# Patient Record
Sex: MALE | Race: WHITE | NOT HISPANIC OR LATINO | Employment: OTHER | URBAN - METROPOLITAN AREA
[De-identification: names, ages, dates, MRNs, and addresses within clinical notes are randomized per-mention and may not be internally consistent; named-entity substitution may affect disease eponyms.]

---

## 2021-01-01 ENCOUNTER — HOSPITAL ENCOUNTER (INPATIENT)
Facility: MEDICAL CENTER | Age: 71
LOS: 4 days | DRG: 039 | End: 2021-01-05
Attending: INTERNAL MEDICINE | Admitting: HOSPITALIST
Payer: MEDICARE

## 2021-01-01 ENCOUNTER — APPOINTMENT (OUTPATIENT)
Dept: RADIOLOGY | Facility: MEDICAL CENTER | Age: 71
DRG: 039 | End: 2021-01-01
Attending: HOSPITALIST
Payer: MEDICARE

## 2021-01-01 ENCOUNTER — HOSPITAL ENCOUNTER (OUTPATIENT)
Dept: RADIOLOGY | Facility: MEDICAL CENTER | Age: 71
End: 2021-01-01
Payer: MEDICARE

## 2021-01-01 DIAGNOSIS — I63.9 ACUTE CVA (CEREBROVASCULAR ACCIDENT) (HCC): ICD-10-CM

## 2021-01-01 PROBLEM — E78.5 HYPERLIPIDEMIA: Status: ACTIVE | Noted: 2021-01-01

## 2021-01-01 LAB — COVID ORDER STATUS COVID19: NORMAL

## 2021-01-01 PROCEDURE — 70551 MRI BRAIN STEM W/O DYE: CPT

## 2021-01-01 PROCEDURE — C9803 HOPD COVID-19 SPEC COLLECT: HCPCS | Performed by: INTERNAL MEDICINE

## 2021-01-01 PROCEDURE — 99221 1ST HOSP IP/OBS SF/LOW 40: CPT | Mod: AI | Performed by: HOSPITALIST

## 2021-01-01 PROCEDURE — 770006 HCHG ROOM/CARE - MED/SURG/GYN SEMI*

## 2021-01-01 PROCEDURE — U0003 INFECTIOUS AGENT DETECTION BY NUCLEIC ACID (DNA OR RNA); SEVERE ACUTE RESPIRATORY SYNDROME CORONAVIRUS 2 (SARS-COV-2) (CORONAVIRUS DISEASE [COVID-19]), AMPLIFIED PROBE TECHNIQUE, MAKING USE OF HIGH THROUGHPUT TECHNOLOGIES AS DESCRIBED BY CMS-2020-01-R: HCPCS

## 2021-01-01 PROCEDURE — U0005 INFEC AGEN DETEC AMPLI PROBE: HCPCS

## 2021-01-01 PROCEDURE — 700111 HCHG RX REV CODE 636 W/ 250 OVERRIDE (IP): Performed by: STUDENT IN AN ORGANIZED HEALTH CARE EDUCATION/TRAINING PROGRAM

## 2021-01-01 RX ORDER — BISACODYL 10 MG
10 SUPPOSITORY, RECTAL RECTAL
Status: DISCONTINUED | OUTPATIENT
Start: 2021-01-01 | End: 2021-01-05 | Stop reason: HOSPADM

## 2021-01-01 RX ORDER — TRAZODONE HYDROCHLORIDE 100 MG/1
50 TABLET ORAL
Status: DISCONTINUED | OUTPATIENT
Start: 2021-01-01 | End: 2021-01-05 | Stop reason: HOSPADM

## 2021-01-01 RX ORDER — BUDESONIDE AND FORMOTEROL FUMARATE DIHYDRATE 160; 4.5 UG/1; UG/1
2 AEROSOL RESPIRATORY (INHALATION) 2 TIMES DAILY
COMMUNITY

## 2021-01-01 RX ORDER — LORAZEPAM 2 MG/ML
0.5 INJECTION INTRAMUSCULAR
Status: COMPLETED | OUTPATIENT
Start: 2021-01-01 | End: 2021-01-01

## 2021-01-01 RX ORDER — ONDANSETRON 4 MG/1
4 TABLET, ORALLY DISINTEGRATING ORAL EVERY 4 HOURS PRN
Status: DISCONTINUED | OUTPATIENT
Start: 2021-01-01 | End: 2021-01-05 | Stop reason: HOSPADM

## 2021-01-01 RX ORDER — POLYETHYLENE GLYCOL 3350 17 G/17G
1 POWDER, FOR SOLUTION ORAL
Status: DISCONTINUED | OUTPATIENT
Start: 2021-01-01 | End: 2021-01-05 | Stop reason: HOSPADM

## 2021-01-01 RX ORDER — AMOXICILLIN 250 MG
2 CAPSULE ORAL 2 TIMES DAILY
Status: DISCONTINUED | OUTPATIENT
Start: 2021-01-01 | End: 2021-01-05 | Stop reason: HOSPADM

## 2021-01-01 RX ORDER — ATORVASTATIN CALCIUM 40 MG/1
40 TABLET, FILM COATED ORAL EVERY EVENING
Status: DISCONTINUED | OUTPATIENT
Start: 2021-01-01 | End: 2021-01-02

## 2021-01-01 RX ORDER — ONDANSETRON 2 MG/ML
4 INJECTION INTRAMUSCULAR; INTRAVENOUS EVERY 4 HOURS PRN
Status: DISCONTINUED | OUTPATIENT
Start: 2021-01-01 | End: 2021-01-03

## 2021-01-01 RX ORDER — ACETAMINOPHEN 325 MG/1
650 TABLET ORAL EVERY 6 HOURS PRN
Status: DISCONTINUED | OUTPATIENT
Start: 2021-01-01 | End: 2021-01-05 | Stop reason: HOSPADM

## 2021-01-01 RX ADMIN — LORAZEPAM 0.5 MG: 2 INJECTION INTRAMUSCULAR; INTRAVENOUS at 21:22

## 2021-01-01 ASSESSMENT — ENCOUNTER SYMPTOMS
DIZZINESS: 0
COUGH: 0
NAUSEA: 0
CHILLS: 0
LOSS OF CONSCIOUSNESS: 0
NERVOUS/ANXIOUS: 1
VOMITING: 0
SPEECH CHANGE: 1
FOCAL WEAKNESS: 0
FALLS: 0
PALPITATIONS: 0
SHORTNESS OF BREATH: 0
ABDOMINAL PAIN: 0
FEVER: 0
HEADACHES: 0

## 2021-01-01 ASSESSMENT — LIFESTYLE VARIABLES
TOTAL SCORE: 0
HAVE YOU EVER FELT YOU SHOULD CUT DOWN ON YOUR DRINKING: NO
TOTAL SCORE: 0
TOTAL SCORE: 0
CONSUMPTION TOTAL: NEGATIVE
EVER FELT BAD OR GUILTY ABOUT YOUR DRINKING: NO
HOW MANY TIMES IN THE PAST YEAR HAVE YOU HAD 5 OR MORE DRINKS IN A DAY: 0
ON A TYPICAL DAY WHEN YOU DRINK ALCOHOL HOW MANY DRINKS DO YOU HAVE: 0
AVERAGE NUMBER OF DAYS PER WEEK YOU HAVE A DRINK CONTAINING ALCOHOL: 0
HAVE PEOPLE ANNOYED YOU BY CRITICIZING YOUR DRINKING: NO
ALCOHOL_USE: NO
EVER HAD A DRINK FIRST THING IN THE MORNING TO STEADY YOUR NERVES TO GET RID OF A HANGOVER: NO
DOES PATIENT WANT TO STOP DRINKING: NO

## 2021-01-01 ASSESSMENT — PATIENT HEALTH QUESTIONNAIRE - PHQ9
2. FEELING DOWN, DEPRESSED, IRRITABLE, OR HOPELESS: NOT AT ALL
SUM OF ALL RESPONSES TO PHQ9 QUESTIONS 1 AND 2: 0
1. LITTLE INTEREST OR PLEASURE IN DOING THINGS: NOT AT ALL

## 2021-01-02 ENCOUNTER — APPOINTMENT (OUTPATIENT)
Dept: CARDIOLOGY | Facility: MEDICAL CENTER | Age: 71
DRG: 039 | End: 2021-01-02
Attending: HOSPITALIST
Payer: MEDICARE

## 2021-01-02 LAB
ALBUMIN SERPL BCP-MCNC: 4 G/DL (ref 3.2–4.9)
ALBUMIN/GLOB SERPL: 1.6 G/DL
ALP SERPL-CCNC: 42 U/L (ref 30–99)
ALT SERPL-CCNC: 24 U/L (ref 2–50)
ANION GAP SERPL CALC-SCNC: 10 MMOL/L (ref 7–16)
AST SERPL-CCNC: 21 U/L (ref 12–45)
BASOPHILS # BLD AUTO: 0.4 % (ref 0–1.8)
BASOPHILS # BLD: 0.04 K/UL (ref 0–0.12)
BILIRUB SERPL-MCNC: 0.9 MG/DL (ref 0.1–1.5)
BUN SERPL-MCNC: 14 MG/DL (ref 8–22)
CALCIUM SERPL-MCNC: 9 MG/DL (ref 8.5–10.5)
CHLORIDE SERPL-SCNC: 101 MMOL/L (ref 96–112)
CHOLEST SERPL-MCNC: 224 MG/DL (ref 100–199)
CO2 SERPL-SCNC: 23 MMOL/L (ref 20–33)
CREAT SERPL-MCNC: 1.1 MG/DL (ref 0.5–1.4)
EOSINOPHIL # BLD AUTO: 0.17 K/UL (ref 0–0.51)
EOSINOPHIL NFR BLD: 1.6 % (ref 0–6.9)
ERYTHROCYTE [DISTWIDTH] IN BLOOD BY AUTOMATED COUNT: 42.4 FL (ref 35.9–50)
EST. AVERAGE GLUCOSE BLD GHB EST-MCNC: 128 MG/DL
GLOBULIN SER CALC-MCNC: 2.5 G/DL (ref 1.9–3.5)
GLUCOSE SERPL-MCNC: 113 MG/DL (ref 65–99)
HBA1C MFR BLD: 6.1 % (ref 0–5.6)
HCT VFR BLD AUTO: 48.5 % (ref 42–52)
HDLC SERPL-MCNC: 51 MG/DL
HGB BLD-MCNC: 16.7 G/DL (ref 14–18)
IMM GRANULOCYTES # BLD AUTO: 0.06 K/UL (ref 0–0.11)
IMM GRANULOCYTES NFR BLD AUTO: 0.6 % (ref 0–0.9)
LDLC SERPL CALC-MCNC: 149 MG/DL
LYMPHOCYTES # BLD AUTO: 2.2 K/UL (ref 1–4.8)
LYMPHOCYTES NFR BLD: 21 % (ref 22–41)
MAGNESIUM SERPL-MCNC: 2 MG/DL (ref 1.5–2.5)
MCH RBC QN AUTO: 31.1 PG (ref 27–33)
MCHC RBC AUTO-ENTMCNC: 34.4 G/DL (ref 33.7–35.3)
MCV RBC AUTO: 90.3 FL (ref 81.4–97.8)
MONOCYTES # BLD AUTO: 1.18 K/UL (ref 0–0.85)
MONOCYTES NFR BLD AUTO: 11.2 % (ref 0–13.4)
NEUTROPHILS # BLD AUTO: 6.84 K/UL (ref 1.82–7.42)
NEUTROPHILS NFR BLD: 65.2 % (ref 44–72)
NRBC # BLD AUTO: 0 K/UL
NRBC BLD-RTO: 0 /100 WBC
PLATELET # BLD AUTO: 220 K/UL (ref 164–446)
PMV BLD AUTO: 9.4 FL (ref 9–12.9)
POTASSIUM SERPL-SCNC: 4.3 MMOL/L (ref 3.6–5.5)
PROT SERPL-MCNC: 6.5 G/DL (ref 6–8.2)
RBC # BLD AUTO: 5.37 M/UL (ref 4.7–6.1)
SARS-COV-2 RNA RESP QL NAA+PROBE: NOTDETECTED
SODIUM SERPL-SCNC: 134 MMOL/L (ref 135–145)
SPECIMEN SOURCE: NORMAL
TRIGL SERPL-MCNC: 121 MG/DL (ref 0–149)
WBC # BLD AUTO: 10.5 K/UL (ref 4.8–10.8)

## 2021-01-02 PROCEDURE — 92610 EVALUATE SWALLOWING FUNCTION: CPT

## 2021-01-02 PROCEDURE — 80053 COMPREHEN METABOLIC PANEL: CPT

## 2021-01-02 PROCEDURE — 99223 1ST HOSP IP/OBS HIGH 75: CPT | Performed by: PSYCHIATRY & NEUROLOGY

## 2021-01-02 PROCEDURE — 36415 COLL VENOUS BLD VENIPUNCTURE: CPT

## 2021-01-02 PROCEDURE — 700102 HCHG RX REV CODE 250 W/ 637 OVERRIDE(OP): Performed by: PSYCHIATRY & NEUROLOGY

## 2021-01-02 PROCEDURE — A9270 NON-COVERED ITEM OR SERVICE: HCPCS | Performed by: HOSPITALIST

## 2021-01-02 PROCEDURE — 83735 ASSAY OF MAGNESIUM: CPT

## 2021-01-02 PROCEDURE — 83036 HEMOGLOBIN GLYCOSYLATED A1C: CPT

## 2021-01-02 PROCEDURE — 770006 HCHG ROOM/CARE - MED/SURG/GYN SEMI*

## 2021-01-02 PROCEDURE — 93306 TTE W/DOPPLER COMPLETE: CPT

## 2021-01-02 PROCEDURE — 99233 SBSQ HOSP IP/OBS HIGH 50: CPT | Performed by: INTERNAL MEDICINE

## 2021-01-02 PROCEDURE — 80061 LIPID PANEL: CPT

## 2021-01-02 PROCEDURE — 700102 HCHG RX REV CODE 250 W/ 637 OVERRIDE(OP): Performed by: HOSPITALIST

## 2021-01-02 PROCEDURE — A9270 NON-COVERED ITEM OR SERVICE: HCPCS | Performed by: PSYCHIATRY & NEUROLOGY

## 2021-01-02 PROCEDURE — 93306 TTE W/DOPPLER COMPLETE: CPT | Mod: 26 | Performed by: INTERNAL MEDICINE

## 2021-01-02 PROCEDURE — 85025 COMPLETE CBC W/AUTO DIFF WBC: CPT

## 2021-01-02 PROCEDURE — 97161 PT EVAL LOW COMPLEX 20 MIN: CPT

## 2021-01-02 RX ORDER — ATORVASTATIN CALCIUM 80 MG/1
80 TABLET, FILM COATED ORAL EVERY EVENING
Status: DISCONTINUED | OUTPATIENT
Start: 2021-01-02 | End: 2021-01-05 | Stop reason: HOSPADM

## 2021-01-02 RX ORDER — CLOPIDOGREL BISULFATE 75 MG/1
75 TABLET ORAL DAILY
Status: DISCONTINUED | OUTPATIENT
Start: 2021-01-02 | End: 2021-01-05 | Stop reason: HOSPADM

## 2021-01-02 RX ADMIN — ATORVASTATIN CALCIUM 80 MG: 80 TABLET, FILM COATED ORAL at 17:59

## 2021-01-02 RX ADMIN — CLOPIDOGREL BISULFATE 75 MG: 75 TABLET ORAL at 12:28

## 2021-01-02 RX ADMIN — TRAZODONE HYDROCHLORIDE 50 MG: 100 TABLET ORAL at 20:01

## 2021-01-02 ASSESSMENT — COGNITIVE AND FUNCTIONAL STATUS - GENERAL
WALKING IN HOSPITAL ROOM: A LITTLE
TOILETING: A LITTLE
DRESSING REGULAR LOWER BODY CLOTHING: A LITTLE
MOBILITY SCORE: 19
SUGGESTED CMS G CODE MODIFIER MOBILITY: CK
HELP NEEDED FOR BATHING: A LITTLE
MOVING FROM LYING ON BACK TO SITTING ON SIDE OF FLAT BED: A LITTLE
CLIMB 3 TO 5 STEPS WITH RAILING: A LITTLE
DRESSING REGULAR UPPER BODY CLOTHING: A LITTLE
CLIMB 3 TO 5 STEPS WITH RAILING: A LITTLE
WALKING IN HOSPITAL ROOM: A LITTLE
MOVING TO AND FROM BED TO CHAIR: A LITTLE
MOBILITY SCORE: 21
SUGGESTED CMS G CODE MODIFIER DAILY ACTIVITY: CJ
DAILY ACTIVITIY SCORE: 20
STANDING UP FROM CHAIR USING ARMS: A LITTLE
STANDING UP FROM CHAIR USING ARMS: A LITTLE
SUGGESTED CMS G CODE MODIFIER MOBILITY: CJ

## 2021-01-02 ASSESSMENT — GAIT ASSESSMENTS
GAIT LEVEL OF ASSIST: SUPERVISED
DEVIATION: INCREASED BASE OF SUPPORT
DISTANCE (FEET): 300

## 2021-01-02 ASSESSMENT — FIBROSIS 4 INDEX: FIB4 SCORE: 1.34
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
## 2021-01-02 NOTE — CARE PLAN
Problem: Risk for Impaired Mobility--Activity Intolerance  Goal: Mobilize and/or Transfer Safely with Maximum Gillespie  Outcome: PROGRESSING AS EXPECTED   Patient ambulating with steady gait  Problem: Knowledge Deficit  Goal: Patient/Significant other demonstrates understanding of disease process, treatment plan, medications and discharge instructions  Outcome: PROGRESSING SLOWER THAN EXPECTED   Patient provided with specific stroke education, educated on stroke risk factors

## 2021-01-02 NOTE — ASSESSMENT & PLAN NOTE
"Reports that he stopped taking his statin ~1 year ago because he heard that statins were \"bad for you\".   - lipid panel pending  - resume statin  "

## 2021-01-02 NOTE — PROGRESS NOTES
[facility] TRIAGE OFFICER DIRECT ADMISSION REPORT  Transferring facility: [facility]  Transferring physician: Dr Reynold Leon, PAC    Chief complaint: Expressive aphasia, slurred speech  Pertinent history & patient course: 70/M who presented to the outlying facility with expressive aphasia and slurred speech with no other motor or sensory deficits, which started yesterday morning. Head CT showed acute CVA. Vascular workup showed complete L ICA occlusion with 95% R ICA occlusion. Neuro IR was contacted who gave the opinion that patient is outside window, and clot is not retrievable. Neurology (Dr. Bazan) has been contacted as well who agreed to the transfer.      Further work up or recommendations per triage officer prior to transfer: none  Consultants called prior to transfer and pertinent input from consultants: Neurology, IR  Patient accepted for transfer: Yes  Consultants to be called upon arrival: Neurology  Admission status: Inpatient.   Floor requested: neurology      Please inform the triage officer upon arrival of the patient to [facility] for assignment of a hospitalist to perform admission.     For any question or concerns regarding the care of this patient, please reach out to the assigned hospitalist.

## 2021-01-02 NOTE — PROGRESS NOTES
With patient’s permission, completed daily phone call to one of patients designated support person, name Livier, timothy  Discussed patient condition and plan of care. All questions answered.  Per daughter, she will update patients wife, marium on plan of care and patients condition.

## 2021-01-02 NOTE — THERAPY
Speech Language Pathology   Clinical Swallow Evaluation     Patient Name: Yuri Smart  AGE:  70 y.o., SEX:  male  Medical Record #: [id number]  Today's Date: 1/2/2021     Precautions  Precautions: (P) Swallow Precautions ( See Comments), Other (See Comments)  Comments: (P) aphasic    Assessment    Patient is 70 y.o. male with a diagnosis of LMCA CVA. Pending MRI results. Per scan at [facility]= LMCA. Pt with no prior SLP within [facility] system. No chest xray in EMR. Per admit records intermittent aphasia and slurred speech. No dysarthria noted but pt with moderate to severe word finding with paraphasic errors and use of non-words. Voice WNL. No marked facial asymmetry. Possible mild oral motor apraxia noted during oral motor assessment. Pt assessed with: single ice chips, NTL from the spoon and cup, thin from spoon and cup, applesauce, and oatmeal. Pt triggering 1-2 swallows in 1-2 seconds. Throat clearing and one cough post applesauce. Slight oral residue that pt is able to clear with lingual sweep with oatmeal. Per nurs, AIC near normal limit and pt does not require ADA. At this time, MM5/MT2 with monitoring and use of posted and recommended swallow strategies. Pt will benefit from lower to moderate level aphasia olu. .  Additional factors influencing patient status/progress: pt able to comprehend with probable better compreh than verbal expression at this time=Brocca's type aphasia.       Plan    Recommend Speech Therapy 5 times per week until therapy goals are met for the following treatments:  Dysphagia Training.    Discharge Recommendations: (P) Recommend post-acute placement for additional speech therapy services prior to discharge home       01/02/21 1207   Oral Motor Eval    Is Patient Able to Complete Oral Motor Eval Yes, Within Normal Limits   Laryngeal Function   Voice Quality Within Functional Limits   Volutional Cough Within Functional Limits   Excursion Upon Swallow Complete   Max Phonation  "Time (Seconds) greater than 10 seconds   Oral Food Presentation   Ice Chips Within Functional Limits   Single Swallow Mildly Thick (2) - (Nectar Thick)  Within Functional Limits   Serial Swallow Mildly Thick (2) - (Nectar Thick) Within Functional Limits   Single Swallow Thin (0) Within Functional Limits   Liquidised (3) Minimal   Minced & Moist (5) - (Dysphagia II) Minimal   Tracheostomy   Tracheostomy  No   Dysphagia Strategies / Recommendations   Strategies / Interventions Recommended (Yes / No) Yes   Compensatory Strategies Monitor During Meals;Reduce Bolus Size to 1/2 Teaspoon;Reduce Bolus Size to 1 Teaspoon;Single Sips / Bites;Alternate Solids / Liquids;Multiple Swallows   Diet / Liquid Recommendation Minced & Moist (5) - (Dysphagia II);Mildly Thick (2) - (Nectar Thick)   Medication Administration  Float Whole with Puree;Crush all Medications in Puree  (crush larger meds and float in applesauce. )   Dysphagia Rating   Nutritional Liquid Intake Rating Scale Thickened beverages (mildly thick unless otherwise specified)   Nutritional Food Intake Rating Scale Total oral diet with multiple consistencies but requiring special preparation or compensations   Patient / Family Goals   Patient / Family Goal #1 \"My speech.\"   Goal #1 Outcome Goal not met   Short Term Goals   Short Term Goal # 1 Pt will consume MM5/MT2 with monitoring during meals and use of posted and recommended swallow strategies.    Goal Outcome # 1 Goal not met   Session Information   Priority 3  (5x,LMCA,ck MRI, MM5/MT2-ice, upgrade as appropNEEDSAPHASIA)         "

## 2021-01-02 NOTE — CONSULTS
"Hospital Neurology Consult:    Referring Physician: Dylan Jensen M.D.    Reason for consultation: Stroke    HPI: Yuri Smart is a 70 y.o. male with history of COPD presenting to the hospital for stroke and consulted for stroke.  Patient presented to [facility] with aphasia.  CTA of the head and neck was performed at that time which showed a left internal carotid artery that was occluded as well as a right internal carotid artery that was 99% stenosed.  I was contacted at that time and the patient was transferred to [facility] for further evaluation.  I had recommended that the patient receive aspirin 325 mg and Plavix 300 mg at that time.  At the time of my exam the patient appears to be doing quite well only making paraphasic errors.  He does not have any complaints other than \"having to sit here doing nothing\".  He denied any sensory deficits or motor deficits along with his stroke symptoms.    ROS:     As above. All other systems reviewed and are negative.    Past Medical History:    has a past medical history of Cancer ([facility]) and COPD (chronic obstructive pulmonary disease) ([facility]).    FHx:  No family history of stroke    SHx:    Denies drug use.     Allergies:  Not on File    Medications:    Current Facility-Administered Medications:   •  clopidogrel (PLAVIX) tablet 75 mg, 75 mg, Oral, DAILY, Maikel Bazan M.D.  •  Pharmacy consult request - Allow for permissive hypertension: SBP up to 220 mmHg/DBP up to 120 mmHg x 48 hours, , Other, PHARMACY TO DOSE, Tabitha Grider M.D.  •  senna-docusate (PERICOLACE or SENOKOT S) 8.6-50 MG per tablet 2 Tab, 2 Tab, Oral, BID, Stopped at 01/01/21 1945 **AND** polyethylene glycol/lytes (MIRALAX) PACKET 1 Packet, 1 Packet, Oral, QDAY PRN **AND** magnesium hydroxide (MILK OF MAGNESIA) suspension 30 mL, 30 mL, Oral, QDAY PRN **AND** bisacodyl (DULCOLAX) suppository 10 mg, 10 mg, Rectal, QDAY PRN, Tabitha Grider M.D.  •  " acetaminophen (Tylenol) tablet 650 mg, 650 mg, Oral, Q6HRS PRN, Tabitha Grider M.D.  •  ondansetron (ZOFRAN) syringe/vial injection 4 mg, 4 mg, Intravenous, Q4HRS PRN, Tabitha Grider M.D.  •  ondansetron (ZOFRAN ODT) dispertab 4 mg, 4 mg, Oral, Q4HRS PRN, Tabitha Grider M.D.  •  aspirin EC (ECOTRIN) tablet 81 mg, 81 mg, Oral, DAILY, Tabitha Grider M.D., Stopped at 01/02/21 0600  •  atorvastatin (LIPITOR) tablet 40 mg, 40 mg, Oral, Q EVENING, Tabitha Grider M.D., Stopped at 01/01/21 2000  •  traZODone (DESYREL) tablet 50 mg, 50 mg, Oral, QHS PRN, Tabitha Grider M.D.    Vitals:   Vitals:    01/01/21 2053 01/02/21 0000 01/02/21 0400 01/02/21 0844   BP: 136/92 135/91 130/65 145/78   Pulse: 76 78 65 80   Resp: 17 17 17 17   Temp: 36.2 °C (97.2 °F) 36.1 °C (97 °F) 36.2 °C (97.2 °F) 36.4 °C (97.5 °F)   TempSrc: Temporal Temporal Temporal Temporal   SpO2: 94% 95% 95% 94%   Weight:       Height:           Labs:     Lab Results   Component Value Date/Time    WBC 10.5 01/02/2021 01:13 AM    RBC 5.37 01/02/2021 01:13 AM    HEMOGLOBIN 16.7 01/02/2021 01:13 AM    HEMATOCRIT 48.5 01/02/2021 01:13 AM    MCV 90.3 01/02/2021 01:13 AM    MCH 31.1 01/02/2021 01:13 AM    MCHC 34.4 01/02/2021 01:13 AM    MPV 9.4 01/02/2021 01:13 AM    NEUTSPOLYS 65.20 01/02/2021 01:13 AM    LYMPHOCYTES 21.00 (L) 01/02/2021 01:13 AM    MONOCYTES 11.20 01/02/2021 01:13 AM    EOSINOPHILS 1.60 01/02/2021 01:13 AM    BASOPHILS 0.40 01/02/2021 01:13 AM      Lab Results   Component Value Date/Time    SODIUM 134 (L) 01/02/2021 06:33 AM    POTASSIUM 4.3 01/02/2021 06:33 AM    CHLORIDE 101 01/02/2021 06:33 AM    CO2 23 01/02/2021 06:33 AM    GLUCOSE 113 (H) 01/02/2021 06:33 AM    BUN 14 01/02/2021 06:33 AM    CREATININE 1.10 01/02/2021 06:33 AM      Lab Results   Component Value Date/Time    CHOLSTRLTOT 224 (H) 01/02/2021 06:33 AM     (H) 01/02/2021 06:33 AM    HDL 51 01/02/2021 06:33 AM    TRIGLYCERIDE 121 01/02/2021 06:33 AM        Lab Results   Component Value Date/Time    ALKPHOSPHAT 42 01/02/2021 06:33 AM    ASTSGOT 21 01/02/2021 06:33 AM    ALTSGPT 24 01/02/2021 06:33 AM    TBILIRUBIN 0.9 01/02/2021 06:33 AM      Imaging/Testing:  MRI brain without contrast personally reviewed which showed infarcts in the left insula and frontal lobe/left MCA distribution without evidence of hemorrhage.    CTA of the head and neck from outside hospital reviewed in chart showing left internal carotid artery 100% occluded and right internal carotid artery with critical stenosis.    Physical Exam:     General: 70-year-old male in bed in no acute distress.  Cardio: Normal S1/S2. No peripheral edema.   Pulm: CTAX2. No respiratory distress.   Skin: Warm, dry, no rashes or lesions   Psychiatric: Appropriate affect. No active psychosis.  HEENT: Atraumatic head, normal sclera and conjunctiva, moist oral mucosa. No lid lag.  Abdomen: Soft, non tender. No masses or hepatosplenomegaly.    1a. Level of Consciousness (Alert, drowsy, etc): 0= Alert    1b. LOC Questions (Month, age): 0= Answers both correctly    1c. LOC Commands (Open/close eyes make fist/let go): 0= Obeys both correctly    2.   Best Gaze (Eyes open - patient follows examiner's finger on face): 0= Normal    3.   Visual Fields (introduce visual stimulus/threat to patient's field quadrants): 0= No visual loss  4.   Facial Paresis (Show teeth, raise eyebrows and squeeze eyes shut): 0= Normal     5a. Motor Arm - Left (Elevate arm to 90 degrees if patient is sitting, 45 degrees if  supine): 0= No drift    5b. Motor Arm - Right (Elevate arm to 90 degrees if patient is sitting, 45 degrees if supine): 0= No drift    6a. Motor Leg - Left (Elevate leg 30 degrees with patient supine): 0= No drift    6b. Motor Leg - Right  (Elevate leg 30 degrees with patient supine): 0= No drift    7.   Limb Ataxia (Finger-nose, heel down shin): 0= No ataxia    8.   Sensory (Pin prick to face, arm, trunk and leg - compare side to  side): 0= Normal    9.  Best Language (Name item, describe a picture and read sentences): 1= Mild to moderate aphasia    10. Dysarthria (Evaluate speech clarity by patient repeating listed words): 0= Normal articulation    11. Extinction and Inattention (Use information from prior testing to identify neglect or  double simultaneous stimuli testing): 0= No neglect    Total NIH Score: 1    Assessment/Plan:    Yuri Smart is a 70 y.o. male with history of COPD presenting to the hospital for stroke and consulted for stroke.  The patient's physical exam is reassuring with only evidence of mild aphasia which is expressive in nature.  He makes occasional paraphasic errors while he speaks however he is able to speak in full sentences and understand.  He has left internal carotid artery occlusion and a critical stenosis of the right internal carotid artery.  He received an aspirin and Plavix load at [facility] prior to being transferred.  I would recommend that the patient continue on dual antiplatelet therapy however given that his right internal carotid artery has critical stenosis even though it is not symptomatic at this time it would merit revascularization given occlusion of the left internal carotid artery; otherwise his brain would be perfused essentially through his posterior circulation.  Mechanism of stroke is artery to artery embolization likely from occlusion of the left internal carotid artery.    Plan:  1.  Continue aspirin 81 mg and Plavix 75 mg  2.  Consult vascular surgery  3.  Continue high intensity statin with atorvastatin 80 mg daily  4.  Hemoglobin A1c is 6.1.  At goal  5.  Speech therapy  6.  We will follow with above recommendations  7.  Plan discussed with consulting physician and patient's nurse.       Maikel Bazan M.D., Diplomat of the American Board of Psychiatry and Neurology  Diplomat of ABPN Epilepsy Subspecialty   Assistant Clinical Professor, [employer]  [facility]  [facility]  Acute Neurology Consultant

## 2021-01-02 NOTE — PROGRESS NOTES
0030: patient with 4.7 second arrest on cardiac monitor, asymptomatic. On call hospitalist updated, orders received to draw AM labs early.    0115: Phlebotomy at bedside, unable to draw enough blood for labs, will attempt redraw.

## 2021-01-02 NOTE — PROGRESS NOTES
Monitor summary: SR, 65-83, LA 0.16, QRS 0.08, QT 0.36, with 3.4-4.7sa rare PAC/PVC per strip from monitor room.

## 2021-01-02 NOTE — DISCHARGE PLANNING
"[facility] Acute [facility] Transitional Care Coordination  Referral from:  Dr. Tabitha Grider  Facesheet indicates: No provider listed - needs to be verified.   Potential Rehab Diagnosis:  L CVA    PT transferred from [facility].   CTA of head showed \"acute left MCA territory infarct with large vessel occlusion of the M3 segment\". Outside the window to attempt retrieval of the clot.  CTA neck showed complete occulusion of the left ICA and 90% occlusion of the right ICA.    Chart review indicates patient has on going medical management and anticipate  therapy needs to possibly meet inpatient rehab facility criteria with the goal of returning to community.    D/C support:  Lives in [place] w/ spouse.      Physiatry consultation pended per protocol; therapy notes.      Last Covid test date.  Results for FILIBERTO EVANGELISTA (MRN [id number]) as of 1/2/2021 10:46   Ref. Range 1/1/2021 22:32   COVID Order Status Unknown Received   SARS-CoV-2 by PCR Unknown NotDetected   SARS-CoV-2 Source Unknown NP Swab         Thank you for the referral.        "

## 2021-01-02 NOTE — ASSESSMENT & PLAN NOTE
"Last normal ~10am on 12/31/20. Left MCA territory infarct. Presented with expressive aphasia and slurred speech. Was evaluated at [facility] and was transferred here for specialty consultation.   - MRI brain pending  - neurology consulted, pending recs  - continue with ASA and statin  - consideration of vascular surgery consult; likely no intervention on the left since it is completely occluded and he had a left MCA infarct  - neuro checks q4 hrs  - A1C and lipid panel pending  - permissive hypertension for 24 more hours, based on symptom onset\"    Await MRI, echo and NEurology recs  R CEA planned todays/p CEA by Dr. Torres 1/3. Drain removed.  Rehab planned.  "
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
## 2021-01-03 ENCOUNTER — ANESTHESIA (OUTPATIENT)
Dept: SURGERY | Facility: MEDICAL CENTER | Age: 71
DRG: 039 | End: 2021-01-03
Payer: MEDICARE

## 2021-01-03 ENCOUNTER — ANESTHESIA EVENT (OUTPATIENT)
Dept: SURGERY | Facility: MEDICAL CENTER | Age: 71
DRG: 039 | End: 2021-01-03
Payer: MEDICARE

## 2021-01-03 LAB
ABO + RH BLD: NORMAL
ABO GROUP BLD: NORMAL
BLD GP AB SCN SERPL QL: NORMAL
EKG IMPRESSION: NORMAL
RH BLD: NORMAL

## 2021-01-03 PROCEDURE — 95940 IONM IN OPERATNG ROOM 15 MIN: CPT | Performed by: SURGERY

## 2021-01-03 PROCEDURE — 160002 HCHG RECOVERY MINUTES (STAT): Performed by: SURGERY

## 2021-01-03 PROCEDURE — 700111 HCHG RX REV CODE 636 W/ 250 OVERRIDE (IP): Performed by: SURGERY

## 2021-01-03 PROCEDURE — 99232 SBSQ HOSP IP/OBS MODERATE 35: CPT | Performed by: INTERNAL MEDICINE

## 2021-01-03 PROCEDURE — 160035 HCHG PACU - 1ST 60 MINS PHASE I: Performed by: SURGERY

## 2021-01-03 PROCEDURE — 501838 HCHG SUTURE GENERAL: Performed by: SURGERY

## 2021-01-03 PROCEDURE — 160048 HCHG OR STATISTICAL LEVEL 1-5: Performed by: SURGERY

## 2021-01-03 PROCEDURE — 99231 SBSQ HOSP IP/OBS SF/LOW 25: CPT | Performed by: PSYCHIATRY & NEUROLOGY

## 2021-01-03 PROCEDURE — 700111 HCHG RX REV CODE 636 W/ 250 OVERRIDE (IP): Performed by: ANESTHESIOLOGY

## 2021-01-03 PROCEDURE — 500368 HCHG DRAIN, 7MM FLAT-FLUTED: Performed by: SURGERY

## 2021-01-03 PROCEDURE — 86901 BLOOD TYPING SEROLOGIC RH(D): CPT

## 2021-01-03 PROCEDURE — 86850 RBC ANTIBODY SCREEN: CPT

## 2021-01-03 PROCEDURE — 700105 HCHG RX REV CODE 258: Performed by: SURGERY

## 2021-01-03 PROCEDURE — 770020 HCHG ROOM/CARE - TELE (206)

## 2021-01-03 PROCEDURE — 51798 US URINE CAPACITY MEASURE: CPT

## 2021-01-03 PROCEDURE — 160039 HCHG SURGERY MINUTES - EA ADDL 1 MIN LEVEL 3: Performed by: SURGERY

## 2021-01-03 PROCEDURE — 95955 EEG DURING SURGERY: CPT | Performed by: SURGERY

## 2021-01-03 PROCEDURE — 700102 HCHG RX REV CODE 250 W/ 637 OVERRIDE(OP): Performed by: HOSPITALIST

## 2021-01-03 PROCEDURE — A9270 NON-COVERED ITEM OR SERVICE: HCPCS | Performed by: SURGERY

## 2021-01-03 PROCEDURE — 160028 HCHG SURGERY MINUTES - 1ST 30 MINS LEVEL 3: Performed by: SURGERY

## 2021-01-03 PROCEDURE — C1781 MESH (IMPLANTABLE): HCPCS | Performed by: SURGERY

## 2021-01-03 PROCEDURE — 501837 HCHG SUTURE CV: Performed by: SURGERY

## 2021-01-03 PROCEDURE — 110454 HCHG SHELL REV 250: Performed by: SURGERY

## 2021-01-03 PROCEDURE — 4A11X4G MONITORING OF PERIPHERAL NERVOUS ELECTRICAL ACTIVITY, INTRAOPERATIVE, EXTERNAL APPROACH: ICD-10-PCS | Performed by: SURGERY

## 2021-01-03 PROCEDURE — A9270 NON-COVERED ITEM OR SERVICE: HCPCS | Performed by: HOSPITALIST

## 2021-01-03 PROCEDURE — 700111 HCHG RX REV CODE 636 W/ 250 OVERRIDE (IP)

## 2021-01-03 PROCEDURE — 36415 COLL VENOUS BLD VENIPUNCTURE: CPT

## 2021-01-03 PROCEDURE — 500257: Performed by: SURGERY

## 2021-01-03 PROCEDURE — 86900 BLOOD TYPING SEROLOGIC ABO: CPT

## 2021-01-03 PROCEDURE — 160009 HCHG ANES TIME/MIN: Performed by: SURGERY

## 2021-01-03 PROCEDURE — 770006 HCHG ROOM/CARE - MED/SURG/GYN SEMI*

## 2021-01-03 PROCEDURE — 700101 HCHG RX REV CODE 250: Performed by: SURGERY

## 2021-01-03 PROCEDURE — 700101 HCHG RX REV CODE 250: Performed by: ANESTHESIOLOGY

## 2021-01-03 PROCEDURE — 700102 HCHG RX REV CODE 250 W/ 637 OVERRIDE(OP): Performed by: SURGERY

## 2021-01-03 PROCEDURE — 93005 ELECTROCARDIOGRAM TRACING: CPT | Performed by: SURGERY

## 2021-01-03 PROCEDURE — A9270 NON-COVERED ITEM OR SERVICE: HCPCS | Performed by: PSYCHIATRY & NEUROLOGY

## 2021-01-03 PROCEDURE — 700105 HCHG RX REV CODE 258: Performed by: ANESTHESIOLOGY

## 2021-01-03 PROCEDURE — 502648 HCHG APPLICATOR, EVICEL: Performed by: SURGERY

## 2021-01-03 PROCEDURE — 500002 HCHG ADHESIVE, DERMABOND: Performed by: SURGERY

## 2021-01-03 PROCEDURE — 03UK0KZ SUPPLEMENT RIGHT INTERNAL CAROTID ARTERY WITH NONAUTOLOGOUS TISSUE SUBSTITUTE, OPEN APPROACH: ICD-10-PCS | Performed by: SURGERY

## 2021-01-03 PROCEDURE — 700102 HCHG RX REV CODE 250 W/ 637 OVERRIDE(OP): Performed by: PSYCHIATRY & NEUROLOGY

## 2021-01-03 PROCEDURE — 95938 SOMATOSENSORY TESTING: CPT | Performed by: SURGERY

## 2021-01-03 PROCEDURE — 03CK0ZZ EXTIRPATION OF MATTER FROM RIGHT INTERNAL CAROTID ARTERY, OPEN APPROACH: ICD-10-PCS | Performed by: SURGERY

## 2021-01-03 PROCEDURE — 160036 HCHG PACU - EA ADDL 30 MINS PHASE I: Performed by: SURGERY

## 2021-01-03 PROCEDURE — 93010 ELECTROCARDIOGRAM REPORT: CPT | Performed by: INTERNAL MEDICINE

## 2021-01-03 PROCEDURE — 88300 SURGICAL PATH GROSS: CPT

## 2021-01-03 DEVICE — PATCH .8X8CM XENOSURE BIOLOGIC VASCULAR---ORDER IN MULTIPLES OF 5---: Type: IMPLANTABLE DEVICE | Status: FUNCTIONAL

## 2021-01-03 RX ORDER — SODIUM CHLORIDE, SODIUM LACTATE, POTASSIUM CHLORIDE, CALCIUM CHLORIDE 600; 310; 30; 20 MG/100ML; MG/100ML; MG/100ML; MG/100ML
INJECTION, SOLUTION INTRAVENOUS
Status: DISCONTINUED | OUTPATIENT
Start: 2021-01-03 | End: 2021-01-03 | Stop reason: SURG

## 2021-01-03 RX ORDER — HYDRALAZINE HYDROCHLORIDE 20 MG/ML
5 INJECTION INTRAMUSCULAR; INTRAVENOUS
Status: DISCONTINUED | OUTPATIENT
Start: 2021-01-03 | End: 2021-01-03 | Stop reason: HOSPADM

## 2021-01-03 RX ORDER — HYDROMORPHONE HYDROCHLORIDE 1 MG/ML
0.1 INJECTION, SOLUTION INTRAMUSCULAR; INTRAVENOUS; SUBCUTANEOUS
Status: DISCONTINUED | OUTPATIENT
Start: 2021-01-03 | End: 2021-01-03 | Stop reason: HOSPADM

## 2021-01-03 RX ORDER — MIDAZOLAM HYDROCHLORIDE 1 MG/ML
INJECTION INTRAMUSCULAR; INTRAVENOUS
Status: COMPLETED
Start: 2021-01-03 | End: 2021-01-03

## 2021-01-03 RX ORDER — DIPHENHYDRAMINE HYDROCHLORIDE 50 MG/ML
25 INJECTION INTRAMUSCULAR; INTRAVENOUS EVERY 6 HOURS PRN
Status: DISCONTINUED | OUTPATIENT
Start: 2021-01-03 | End: 2021-01-05 | Stop reason: HOSPADM

## 2021-01-03 RX ORDER — OXYCODONE HCL 5 MG/5 ML
5 SOLUTION, ORAL ORAL
Status: DISCONTINUED | OUTPATIENT
Start: 2021-01-03 | End: 2021-01-03 | Stop reason: HOSPADM

## 2021-01-03 RX ORDER — DEXAMETHASONE SODIUM PHOSPHATE 4 MG/ML
INJECTION, SOLUTION INTRA-ARTICULAR; INTRALESIONAL; INTRAMUSCULAR; INTRAVENOUS; SOFT TISSUE PRN
Status: DISCONTINUED | OUTPATIENT
Start: 2021-01-03 | End: 2021-01-03 | Stop reason: SURG

## 2021-01-03 RX ORDER — HALOPERIDOL 5 MG/ML
1 INJECTION INTRAMUSCULAR EVERY 6 HOURS PRN
Status: DISCONTINUED | OUTPATIENT
Start: 2021-01-03 | End: 2021-01-05 | Stop reason: HOSPADM

## 2021-01-03 RX ORDER — DEXAMETHASONE SODIUM PHOSPHATE 4 MG/ML
4 INJECTION, SOLUTION INTRA-ARTICULAR; INTRALESIONAL; INTRAMUSCULAR; INTRAVENOUS; SOFT TISSUE
Status: DISCONTINUED | OUTPATIENT
Start: 2021-01-03 | End: 2021-01-05 | Stop reason: HOSPADM

## 2021-01-03 RX ORDER — HYDROMORPHONE HYDROCHLORIDE 1 MG/ML
0.4 INJECTION, SOLUTION INTRAMUSCULAR; INTRAVENOUS; SUBCUTANEOUS
Status: DISCONTINUED | OUTPATIENT
Start: 2021-01-03 | End: 2021-01-03 | Stop reason: HOSPADM

## 2021-01-03 RX ORDER — LABETALOL HYDROCHLORIDE 5 MG/ML
INJECTION, SOLUTION INTRAVENOUS PRN
Status: DISCONTINUED | OUTPATIENT
Start: 2021-01-03 | End: 2021-01-03 | Stop reason: SURG

## 2021-01-03 RX ORDER — MIDAZOLAM HYDROCHLORIDE 1 MG/ML
1 INJECTION INTRAMUSCULAR; INTRAVENOUS
Status: COMPLETED | OUTPATIENT
Start: 2021-01-03 | End: 2021-01-03

## 2021-01-03 RX ORDER — KETOROLAC TROMETHAMINE 30 MG/ML
15 INJECTION, SOLUTION INTRAMUSCULAR; INTRAVENOUS EVERY 6 HOURS
Status: DISCONTINUED | OUTPATIENT
Start: 2021-01-03 | End: 2021-01-05 | Stop reason: HOSPADM

## 2021-01-03 RX ORDER — HEPARIN SODIUM 1000 [USP'U]/ML
INJECTION, SOLUTION INTRAVENOUS; SUBCUTANEOUS PRN
Status: DISCONTINUED | OUTPATIENT
Start: 2021-01-03 | End: 2021-01-03 | Stop reason: SURG

## 2021-01-03 RX ORDER — PROTAMINE SULFATE 10 MG/ML
INJECTION, SOLUTION INTRAVENOUS PRN
Status: DISCONTINUED | OUTPATIENT
Start: 2021-01-03 | End: 2021-01-03 | Stop reason: HOSPADM

## 2021-01-03 RX ORDER — OXYCODONE HYDROCHLORIDE 5 MG/1
5 TABLET ORAL
Status: DISCONTINUED | OUTPATIENT
Start: 2021-01-03 | End: 2021-01-05 | Stop reason: HOSPADM

## 2021-01-03 RX ORDER — LIDOCAINE HYDROCHLORIDE 20 MG/ML
INJECTION, SOLUTION EPIDURAL; INFILTRATION; INTRACAUDAL; PERINEURAL PRN
Status: DISCONTINUED | OUTPATIENT
Start: 2021-01-03 | End: 2021-01-03 | Stop reason: SURG

## 2021-01-03 RX ORDER — HALOPERIDOL 5 MG/ML
1 INJECTION INTRAMUSCULAR
Status: COMPLETED | OUTPATIENT
Start: 2021-01-03 | End: 2021-01-03

## 2021-01-03 RX ORDER — ONDANSETRON 2 MG/ML
4 INJECTION INTRAMUSCULAR; INTRAVENOUS EVERY 4 HOURS PRN
Status: DISCONTINUED | OUTPATIENT
Start: 2021-01-03 | End: 2021-01-05 | Stop reason: HOSPADM

## 2021-01-03 RX ORDER — BUPIVACAINE HYDROCHLORIDE AND EPINEPHRINE 5; 5 MG/ML; UG/ML
INJECTION, SOLUTION EPIDURAL; INTRACAUDAL; PERINEURAL
Status: DISCONTINUED | OUTPATIENT
Start: 2021-01-03 | End: 2021-01-03 | Stop reason: HOSPADM

## 2021-01-03 RX ORDER — ONDANSETRON 2 MG/ML
4 INJECTION INTRAMUSCULAR; INTRAVENOUS
Status: DISCONTINUED | OUTPATIENT
Start: 2021-01-03 | End: 2021-01-03 | Stop reason: HOSPADM

## 2021-01-03 RX ORDER — ROCURONIUM BROMIDE 10 MG/ML
INJECTION, SOLUTION INTRAVENOUS PRN
Status: DISCONTINUED | OUTPATIENT
Start: 2021-01-03 | End: 2021-01-03 | Stop reason: SURG

## 2021-01-03 RX ORDER — OXYCODONE HYDROCHLORIDE 10 MG/1
10 TABLET ORAL
Status: DISCONTINUED | OUTPATIENT
Start: 2021-01-03 | End: 2021-01-05 | Stop reason: HOSPADM

## 2021-01-03 RX ORDER — SCOLOPAMINE TRANSDERMAL SYSTEM 1 MG/1
1 PATCH, EXTENDED RELEASE TRANSDERMAL
Status: DISCONTINUED | OUTPATIENT
Start: 2021-01-03 | End: 2021-01-05 | Stop reason: HOSPADM

## 2021-01-03 RX ORDER — ACETAMINOPHEN 325 MG/1
650 TABLET ORAL EVERY 6 HOURS
Status: DISCONTINUED | OUTPATIENT
Start: 2021-01-03 | End: 2021-01-05 | Stop reason: HOSPADM

## 2021-01-03 RX ORDER — HYDROMORPHONE HYDROCHLORIDE 1 MG/ML
0.2 INJECTION, SOLUTION INTRAMUSCULAR; INTRAVENOUS; SUBCUTANEOUS
Status: DISCONTINUED | OUTPATIENT
Start: 2021-01-03 | End: 2021-01-03 | Stop reason: HOSPADM

## 2021-01-03 RX ORDER — LABETALOL HYDROCHLORIDE 5 MG/ML
5 INJECTION, SOLUTION INTRAVENOUS
Status: DISCONTINUED | OUTPATIENT
Start: 2021-01-03 | End: 2021-01-03 | Stop reason: HOSPADM

## 2021-01-03 RX ORDER — SODIUM CHLORIDE, SODIUM LACTATE, POTASSIUM CHLORIDE, CALCIUM CHLORIDE 600; 310; 30; 20 MG/100ML; MG/100ML; MG/100ML; MG/100ML
INJECTION, SOLUTION INTRAVENOUS CONTINUOUS
Status: DISCONTINUED | OUTPATIENT
Start: 2021-01-03 | End: 2021-01-03 | Stop reason: HOSPADM

## 2021-01-03 RX ORDER — DIPHENHYDRAMINE HYDROCHLORIDE 50 MG/ML
12.5 INJECTION INTRAMUSCULAR; INTRAVENOUS
Status: DISCONTINUED | OUTPATIENT
Start: 2021-01-03 | End: 2021-01-03 | Stop reason: HOSPADM

## 2021-01-03 RX ORDER — CEFAZOLIN SODIUM 1 G/3ML
INJECTION, POWDER, FOR SOLUTION INTRAMUSCULAR; INTRAVENOUS PRN
Status: DISCONTINUED | OUTPATIENT
Start: 2021-01-03 | End: 2021-01-03 | Stop reason: SURG

## 2021-01-03 RX ORDER — PHENYLEPHRINE HCL IN 0.9% NACL 0.5 MG/5ML
SYRINGE (ML) INTRAVENOUS PRN
Status: DISCONTINUED | OUTPATIENT
Start: 2021-01-03 | End: 2021-01-03 | Stop reason: SURG

## 2021-01-03 RX ORDER — MEPERIDINE HYDROCHLORIDE 25 MG/ML
12.5 INJECTION INTRAMUSCULAR; INTRAVENOUS; SUBCUTANEOUS
Status: DISCONTINUED | OUTPATIENT
Start: 2021-01-03 | End: 2021-01-03 | Stop reason: HOSPADM

## 2021-01-03 RX ORDER — OXYCODONE HCL 5 MG/5 ML
10 SOLUTION, ORAL ORAL
Status: DISCONTINUED | OUTPATIENT
Start: 2021-01-03 | End: 2021-01-03 | Stop reason: HOSPADM

## 2021-01-03 RX ADMIN — FENTANYL CITRATE 50 MCG: 50 INJECTION, SOLUTION INTRAMUSCULAR; INTRAVENOUS at 13:50

## 2021-01-03 RX ADMIN — ROCURONIUM BROMIDE 50 MG: 10 INJECTION, SOLUTION INTRAVENOUS at 13:35

## 2021-01-03 RX ADMIN — PROTAMINE SULFATE 50 MG: 10 INJECTION, SOLUTION INTRAVENOUS at 15:04

## 2021-01-03 RX ADMIN — ASPIRIN 81 MG: 81 TABLET, COATED ORAL at 05:45

## 2021-01-03 RX ADMIN — KETOROLAC TROMETHAMINE 15 MG: 30 INJECTION, SOLUTION INTRAMUSCULAR at 20:36

## 2021-01-03 RX ADMIN — SUGAMMADEX 200 MG: 100 INJECTION, SOLUTION INTRAVENOUS at 15:16

## 2021-01-03 RX ADMIN — CEFAZOLIN 3 G: 330 INJECTION, POWDER, FOR SOLUTION INTRAMUSCULAR; INTRAVENOUS at 13:50

## 2021-01-03 RX ADMIN — HALOPERIDOL LACTATE 1 MG: 5 INJECTION, SOLUTION INTRAMUSCULAR at 16:21

## 2021-01-03 RX ADMIN — FENTANYL CITRATE 50 MCG: 50 INJECTION, SOLUTION INTRAMUSCULAR; INTRAVENOUS at 17:30

## 2021-01-03 RX ADMIN — FENTANYL CITRATE 50 MCG: 50 INJECTION, SOLUTION INTRAMUSCULAR; INTRAVENOUS at 16:16

## 2021-01-03 RX ADMIN — CLOPIDOGREL BISULFATE 75 MG: 75 TABLET ORAL at 05:45

## 2021-01-03 RX ADMIN — SODIUM CHLORIDE, POTASSIUM CHLORIDE, SODIUM LACTATE AND CALCIUM CHLORIDE: 600; 310; 30; 20 INJECTION, SOLUTION INTRAVENOUS at 13:29

## 2021-01-03 RX ADMIN — LABETALOL HYDROCHLORIDE 5 MG: 5 INJECTION, SOLUTION INTRAVENOUS at 14:34

## 2021-01-03 RX ADMIN — ACETAMINOPHEN 650 MG: 325 TABLET, FILM COATED ORAL at 20:35

## 2021-01-03 RX ADMIN — HALOPERIDOL LACTATE 1 MG: 5 INJECTION, SOLUTION INTRAMUSCULAR at 15:43

## 2021-01-03 RX ADMIN — MIDAZOLAM HYDROCHLORIDE 1 MG: 1 INJECTION, SOLUTION INTRAMUSCULAR; INTRAVENOUS at 15:57

## 2021-01-03 RX ADMIN — MIDAZOLAM HYDROCHLORIDE 1 MG: 1 INJECTION, SOLUTION INTRAMUSCULAR; INTRAVENOUS at 15:29

## 2021-01-03 RX ADMIN — HEPARIN SODIUM 10000 UNITS: 1000 INJECTION, SOLUTION INTRAVENOUS; SUBCUTANEOUS at 14:23

## 2021-01-03 RX ADMIN — MIDAZOLAM HYDROCHLORIDE 1 MG: 1 INJECTION, SOLUTION INTRAMUSCULAR; INTRAVENOUS at 17:41

## 2021-01-03 RX ADMIN — PROPOFOL 160 MG: 10 INJECTION, EMULSION INTRAVENOUS at 13:35

## 2021-01-03 RX ADMIN — MIDAZOLAM HYDROCHLORIDE 1 MG: 1 INJECTION, SOLUTION INTRAMUSCULAR; INTRAVENOUS at 15:35

## 2021-01-03 RX ADMIN — Medication 100 MCG: at 13:40

## 2021-01-03 RX ADMIN — HALOPERIDOL LACTATE 1 MG: 5 INJECTION, SOLUTION INTRAMUSCULAR at 15:58

## 2021-01-03 RX ADMIN — LIDOCAINE HYDROCHLORIDE 50 MG: 20 INJECTION, SOLUTION EPIDURAL; INFILTRATION; INTRACAUDAL at 13:35

## 2021-01-03 RX ADMIN — DEXAMETHASONE SODIUM PHOSPHATE 8 MG: 4 INJECTION, SOLUTION INTRA-ARTICULAR; INTRALESIONAL; INTRAMUSCULAR; INTRAVENOUS; SOFT TISSUE at 13:47

## 2021-01-03 RX ADMIN — FENTANYL CITRATE 100 MCG: 50 INJECTION, SOLUTION INTRAMUSCULAR; INTRAVENOUS at 13:33

## 2021-01-03 RX ADMIN — FENTANYL CITRATE 50 MCG: 50 INJECTION, SOLUTION INTRAMUSCULAR; INTRAVENOUS at 14:02

## 2021-01-03 RX ADMIN — FENTANYL CITRATE 50 MCG: 50 INJECTION, SOLUTION INTRAMUSCULAR; INTRAVENOUS at 14:14

## 2021-01-03 NOTE — ANESTHESIA PROCEDURE NOTES
Arterial Line  Performed by: Rafi Watson M.D.  Authorized by: Rafi Watson M.D.     Start Time:  1/3/2021 1:38 PM  End Time:  1/3/2021 1:50 PM  Localization: surface landmarks    Patient Location:  OR  Indication: continuous blood pressure monitoring        Catheter Size:  20 G  Seldinger Technique?: Yes    Laterality:  Left  Site:  Radial artery  Line Secured:  Antimicrobial disc, tape and transparent dressing  Events: patient tolerated procedure well with no complications

## 2021-01-03 NOTE — THERAPY
Missed Therapy     Patient Name: Yuri Smart  Age:  70 y.o., Sex:  male  Medical Record #: [id number]  Today's Date: 1/3/2021       01/03/21 1343   Interdisciplinary Plan of Care Collaboration   Collaboration Comments  OT yaneth talley, pt currently in surgery. OT to see pt post op as medically appropriate

## 2021-01-03 NOTE — PROGRESS NOTES
Monitor summary: SR/ST, , NC 0.16, QRS 0.10, QT 0.40, with rare PVC per strip from monitor room.

## 2021-01-03 NOTE — CARE PLAN
Problem: Skin Integrity  Goal: Skin Integrity is maintained or improved  Outcome: PROGRESSING AS EXPECTED   Skin intact, patient able to move self side to side in bed  Problem: Psychosocial needs  Goal: Anxiety reduction  Outcome: PROGRESSING SLOWER THAN EXPECTED   Patient anxious about treatment plan, having a stroke and the possibility of having another stroke. Patient frequently re-educated on stroke risk factors and importance of medication compliance
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
## 2021-01-03 NOTE — PROGRESS NOTES
With patient’s permission, completed daily phone call to designated support person, Isela (wife). Discussed patient condition and plan of care (over the phone in AM and in person during visiting hours). All questions answered.    Follow up requested: pt's wife would like an update tomorrow (1/3/21) after the doctors round.    Night shift RN updated and asked to convey this request to the day team in AM.

## 2021-01-03 NOTE — CONSULTS
[facility] Neurology Progress Note:     Interval History: No acute events overnight.  No new complaints today.    Objective:   Vitals:    01/02/21 2000 01/02/21 2338 01/03/21 0400 01/03/21 0921   BP: 128/60 122/78 112/76 124/82   Pulse: 86 66 68 76   Resp: 16 16 16 16   Temp: 36.4 °C (97.6 °F) 36.4 °C (97.5 °F) 36.7 °C (98 °F) 36.2 °C (97.2 °F)   TempSrc: Temporal Temporal Temporal Temporal   SpO2: 93% 92% 92% 94%   Weight: 96 kg (211 lb 10.3 oz)      Height:           Labs:        Lab Results   Component Value Date/Time    WBC 10.5 01/02/2021 01:13 AM    RBC 5.37 01/02/2021 01:13 AM    HEMOGLOBIN 16.7 01/02/2021 01:13 AM    HEMATOCRIT 48.5 01/02/2021 01:13 AM    MCV 90.3 01/02/2021 01:13 AM    MCH 31.1 01/02/2021 01:13 AM    MCHC 34.4 01/02/2021 01:13 AM    MPV 9.4 01/02/2021 01:13 AM    NEUTSPOLYS 65.20 01/02/2021 01:13 AM    LYMPHOCYTES 21.00 (L) 01/02/2021 01:13 AM    MONOCYTES 11.20 01/02/2021 01:13 AM    EOSINOPHILS 1.60 01/02/2021 01:13 AM    BASOPHILS 0.40 01/02/2021 01:13 AM      Lab Results   Component Value Date/Time    SODIUM 134 (L) 01/02/2021 06:33 AM    POTASSIUM 4.3 01/02/2021 06:33 AM    CHLORIDE 101 01/02/2021 06:33 AM    CO2 23 01/02/2021 06:33 AM    GLUCOSE 113 (H) 01/02/2021 06:33 AM    BUN 14 01/02/2021 06:33 AM    CREATININE 1.10 01/02/2021 06:33 AM      Lab Results   Component Value Date/Time    CHOLSTRLTOT 224 (H) 01/02/2021 06:33 AM     (H) 01/02/2021 06:33 AM    HDL 51 01/02/2021 06:33 AM    TRIGLYCERIDE 121 01/02/2021 06:33 AM       Lab Results   Component Value Date/Time    ALKPHOSPHAT 42 01/02/2021 06:33 AM    ASTSGOT 21 01/02/2021 06:33 AM    ALTSGPT 24 01/02/2021 06:33 AM    TBILIRUBIN 0.9 01/02/2021 06:33 AM        Imaging/Testing:   No new neuroimaging to review.    Physical Exam:      General: 70-year-old male in bed in no acute distress.  Cardio: Normal S1/S2. No peripheral edema.   Pulm: CTAX2. No respiratory distress.   Skin: Warm, dry, no rashes or lesions   Psychiatric:  Appropriate affect. No active psychosis.  HEENT: Atraumatic head, normal sclera and conjunctiva, moist oral mucosa. No lid lag.  Abdomen: Soft, non tender. No masses or hepatosplenomegaly.     1a. Level of Consciousness (Alert, drowsy, etc): 0= Alert     1b. LOC Questions (Month, age): 0= Answers both correctly     1c. LOC Commands (Open/close eyes make fist/let go): 0= Obeys both correctly     2.   Best Gaze (Eyes open - patient follows examiner's finger on face): 0= Normal     3.   Visual Fields (introduce visual stimulus/threat to patient's field quadrants): 0= No visual loss  4.   Facial Paresis (Show teeth, raise eyebrows and squeeze eyes shut): 0= Normal             5a. Motor Arm - Left (Elevate arm to 90 degrees if patient is sitting, 45 degrees if  supine): 0= No drift     5b. Motor Arm - Right (Elevate arm to 90 degrees if patient is sitting, 45 degrees if supine): 0= No drift     6a. Motor Leg - Left (Elevate leg 30 degrees with patient supine): 0= No drift     6b. Motor Leg - Right  (Elevate leg 30 degrees with patient supine): 0= No drift     7.   Limb Ataxia (Finger-nose, heel down shin): 0= No ataxia     8.   Sensory (Pin prick to face, arm, trunk and leg - compare side to side): 0= Normal     9.  Best Language (Name item, describe a picture and read sentences): 1= Mild to moderate aphasia     10. Dysarthria (Evaluate speech clarity by patient repeating listed words): 0= Normal articulation     11. Extinction and Inattention (Use information from prior testing to identify neglect or  double simultaneous stimuli testing): 0= No neglect     Total NIH Score: 1    Patient seen and examined on 1/3/2021 and compared to 1/2/2021 no significant clinical change was seen.    Assessment/Plan:    Yuri Smart is a 70 y.o. male with history of COPD presenting to the hospital for stroke and consulted for stroke.  The patient's physical exam is reassuring with only evidence of mild aphasia which is expressive in  nature.  He makes occasional paraphasic errors while he speaks however he is able to speak in full sentences and understand.  He has left internal carotid artery occlusion and a critical stenosis of the right internal carotid artery.  He received an aspirin and Plavix load at [facility] prior to being transferred.  I would recommend that the patient continue on dual antiplatelet therapy however given that his right internal carotid artery has critical stenosis even though it is not symptomatic at this time it would merit revascularization given occlusion of the left internal carotid artery; otherwise his brain would be perfused essentially through his posterior circulation.  Mechanism of stroke is artery to artery embolization likely from occlusion of the left internal carotid artery.    Plan:  1.  Continue dual antiplatelet therapy  2.  Vascular surgery with plan for right carotid endarterectomy  3.  Continue high intensity statin  4.  Place referral with [facility]  5.  Signing off at this time.  Please call with any further questions or concerns.  6.  Plan discussed with consulting physician and patient's nurse.     Maikel Bazan M.D., Diplomat of the American Board of Psychiatry and Neurology  Diplomat of [employer] Epilepsy Subspecialty   Assistant Clinical Professor, [facility] Neurology Consultant

## 2021-01-03 NOTE — ANESTHESIA PREPROCEDURE EVALUATION
Relevant Problems   NEURO   (+) Acute CVA (cerebrovascular accident) (HCC)       Physical Exam    Airway   Mallampati: II  TM distance: >3 FB  Neck ROM: full       Cardiovascular - normal exam  Rhythm: regular  Rate: normal  (-) murmur     Dental - normal exam           Pulmonary - normal exam  Breath sounds clear to auscultation     Abdominal    Neurological - normal exam                 Anesthesia Plan    ASA 4   ASA physical status 4 criteria: CVA or TIA - recent (< 3 months)    Plan - general       Airway plan will be ETT      Plan Factors:   Patient was previously instructed to abstain from smoking on day of procedure.  Patient did not smoke on day of procedure.      Induction: intravenous          Informed Consent:    Anesthetic plan and risks discussed with patient.    Use of blood products discussed with: patient whom.

## 2021-01-03 NOTE — CONSULTS
Vascular Surgery Consult Note  -------------------------------------------------------------------------------------------------  Date: 1/2/2021    Consulting Physician: Russel Torres M.D. [facility]  -------------------------------------------------------------------------------------------------    Reason for consultation:  Carotid stenosis    HPI:  This is a 70 y.o. male with no significant past medical history who presented to the hospital with aphasia.  MRI shows multiple small infarcts in the left hemisphere.  CTA shows left internal carotid artery occlusion and a very high-grade right carotid artery stenosis.  He is currently alert and conversant although still having difficulty with word finding.  Speech is clear otherwise.  He is right-handed.    Past Medical History:   Diagnosis Date   • Cancer (HCC)     prostate   • COPD (chronic obstructive pulmonary disease) (HCC)        No past surgical history on file.    Current Facility-Administered Medications   Medication Dose Route Frequency Provider Last Rate Last Admin   • clopidogrel (PLAVIX) tablet 75 mg  75 mg Oral DAILY Maikel Bazan M.D.   75 mg at 01/02/21 1228   • atorvastatin (LIPITOR) tablet 80 mg  80 mg Oral Q EVENING Maikel Bazan M.D.   80 mg at 01/02/21 4985   • Pharmacy consult request - Allow for permissive hypertension: SBP up to 220 mmHg/DBP up to 120 mmHg x 48 hours   Other PHARMACY TO DOSE Tabitha Grider M.D.       • senna-docusate (PERICOLACE or SENOKOT S) 8.6-50 MG per tablet 2 Tab  2 Tab Oral BID Tabitha Grider M.D.   Stopped at 01/01/21 1945    And   • polyethylene glycol/lytes (MIRALAX) PACKET 1 Packet  1 Packet Oral QDAY PRN Tabitha Grider M.D.        And   • magnesium hydroxide (MILK OF MAGNESIA) suspension 30 mL  30 mL Oral QDAY PRN Tabitha Grider M.D.        And   • bisacodyl (DULCOLAX) suppository 10 mg  10 mg Rectal QDAY PRN Tabitha Grider M.D.       • acetaminophen (Tylenol)  tablet 650 mg  650 mg Oral Q6HRS PRN Tabitha Grider M.D.       • ondansetron (ZOFRAN) syringe/vial injection 4 mg  4 mg Intravenous Q4HRS PRN Tabitha Grider M.D.       • ondansetron (ZOFRAN ODT) dispertab 4 mg  4 mg Oral Q4HRS PRN Tabitha Grider M.D.       • aspirin EC (ECOTRIN) tablet 81 mg  81 mg Oral DAILY Tabitha Grider M.D.   Stopped at 01/02/21 0600   • traZODone (DESYREL) tablet 50 mg  50 mg Oral QHS PRN Tabitha Grider M.D.           Social History     Socioeconomic History   • Marital status:      Spouse name: Not on file   • Number of children: Not on file   • Years of education: Not on file   • Highest education level: Not on file   Occupational History   • Not on file   Social Needs   • Financial resource strain: Not on file   • Food insecurity     Worry: Not on file     Inability: Not on file   • Transportation needs     Medical: Not on file     Non-medical: Not on file   Tobacco Use   • Smoking status: Not on file   Substance and Sexual Activity   • Alcohol use: Not on file   • Drug use: Not on file   • Sexual activity: Not on file   Lifestyle   • Physical activity     Days per week: Not on file     Minutes per session: Not on file   • Stress: Not on file   Relationships   • Social connections     Talks on phone: Not on file     Gets together: Not on file     Attends Advent service: Not on file     Active member of club or organization: Not on file     Attends meetings of clubs or organizations: Not on file     Relationship status: Not on file   • Intimate partner violence     Fear of current or ex partner: Not on file     Emotionally abused: Not on file     Physically abused: Not on file     Forced sexual activity: Not on file   Other Topics Concern   • Not on file   Social History Narrative   • Not on file       No family history on file.    Allergies:  Patient has no allergy information on record.    Review of Systems:  Noncontributory except as per HPI    Physical  "Exam:  /75   Pulse 80   Temp 36.5 °C (97.7 °F) (Temporal)   Resp 17   Ht 1.803 m (5' 11\")   Wt 95.6 kg (210 lb 12.2 oz)   SpO2 94%     Constitutional: Alert, oriented, no acute distress  HEENT:  Normocephalic and atraumatic, EOMI  Neck:   Supple, no JVD,   Cardiovascular: Regular rate and rhythm,   Pulmonary:  Good air entry bilaterally,    Abdominal:  Soft, non-tender, non-distended     Aortic impulse not widened  Musculoskeletal: No edema, no tenderness  Neurological:  CN II-XII grossly intact, no focal deficits, mild Broca's aphasia  Skin:   Skin is warm and dry. No rash noted.  Psychiatric:  Normal mood and affect.    Labs:  Recent Labs     01/02/21  0113   WBC 10.5   RBC 5.37   HEMOGLOBIN 16.7   HEMATOCRIT 48.5   MCV 90.3   MCH 31.1   MCHC 34.4   RDW 42.4   PLATELETCT 220   MPV 9.4     Recent Labs     01/02/21  0633   SODIUM 134*   POTASSIUM 4.3   CHLORIDE 101   CO2 23   GLUCOSE 113*   BUN 14   CREATININE 1.10   CALCIUM 9.0         Recent Labs     01/02/21  0633   ASTSGOT 21   ALTSGPT 24   TBILIRUBIN 0.9   ALKPHOSPHAT 42   GLOBULIN 2.5       Radiology:  MRI:   1.  Mild cerebral atrophy.  2.  Moderate supratentorial white matter disease most consistent with microvascular ischemic change.  3.  Clustered confluent and gyriform areas of acute infarction involving the left insula, frontal operculum, and left posterior frontal-parietal convexity in the territory of left MCA sylvian branches. No hemorrhagic transformation.  4.  Left internal carotid artery occlusion.    CTA Neck:  Left ICA occlusion and right ICA high-grade stenosis    Assessment/Plan:  -Left hemispheric stroke  -Left ICA occlusion  -Right ICA high-grade stenosis  -COPD    Patient's left hemispheric stroke and aphasia could have come from an acute left ICA occlusion.  This occlusion is inoperable.  However he also has a high-grade right ICA stenosis putting him at risk for additional and perhaps worse strokes.  I would recommend a right " carotid endarterectomy prior to discharge.  We will load him with Plavix and plan for surgery in the next 24 to 48 hours.  I had a detailed discussion with the patient who had difficulty understanding all the information and so we had a telephone conversation with his wife on speaker phone to review all the information as well.  She has a clear understanding of all the information and is agreeable to proceed with the operation.      Russel Torres MD  [facility] (General and Vascular Surgery)  Cell: [phone] (text or call is fine, if you don't reach me please try my office)  Office: [phone]    1/2/2021    7:57 PM  ___________________________________________________________________  Patient:Yuri CardozaBing   MRN:[id number]   CSN:[id number]

## 2021-01-03 NOTE — PROGRESS NOTES
Vascular    OR today for right carotid endarterectomy, tentatively around 1300    Russel Torres MD  [facility] (General and Vascular Surgery)  Cell: [phone] (text or call is fine, if you don't reach me please try my office)  Office: [phone]  __________________________________________________________________  Patient:Yuri Bing   MRN:[id number]   CSN:[id number]    1/3/2021    11:31 AM

## 2021-01-03 NOTE — ANESTHESIA PROCEDURE NOTES
Airway    Date/Time: 1/3/2021 1:36 PM  Performed by: Rafi Watson M.D.  Authorized by: Rafi Watson M.D.     Location:  OR  Urgency:  Elective  Difficult Airway: No    Indications for Airway Management:  Anesthesia      Spontaneous Ventilation: absent    Sedation Level:  Deep  Preoxygenated: Yes    Patient Position:  Sniffing  Final Airway Type:  Endotracheal airway  Final Endotracheal Airway:  ETT  Cuffed: Yes    Technique Used for Successful ETT Placement:  Direct laryngoscopy    Insertion Site:  Oral  Blade Type:  Penelope  Laryngoscope Blade/Videolaryngoscope Blade Size:  3  ETT Size (mm):  8.0  Measured from:  Lips  ETT to Lips (cm):  23  Placement Verified by: auscultation and capnometry    Cormack-Lehane Classification:  Grade IIa - partial view of glottis  Number of Attempts at Approach:  1  Number of Other Approaches Attempted:  0

## 2021-01-03 NOTE — OP REPORT
Vascular Surgery Operative Note  --------------------------------------------    Date of Service: 1/3/2021    Patient Name:  Yuri Smart    Patient MRN:  [id number]    --------------------------------------------------------------------------------------------------    Preoperative Diagnosis:  1) High-grade right carotid stenosis with left carotid occlusion and aphasia  2) left hemispheric embolic stroke with aphasia    Postoperative Diagnosis:  1) High-grade right carotid stenosis with left carotid occlusion  2) left hemispheric embolic stroke with aphasia    Procedure:  1) right carotid endarterectomy with neuromonitoring    -------------------------------------------------------------------------------------------------    Surgeon:   Russel Torres MD    Assistant:   Rojas BECKFORD    Anesthesia:   GETA    EBL:    20 cc    Blood Products:  none    Heparin:   Systemically heparinized, 10,000 units    Specimen:   None sent    Complications:  None     Disposition:   PACU in stable condition     Findings:   Coarse irregular plaque      JEREZ postoperatively    -----------------------------------------------------------------------------------------------------    History:  Yuri Smart is a 70 y.o. male who sustained a left hemispheric stroke from left ICA occlusion. He was also found to have a very high grade stenosis of his right carotid artery concerning for high risk of right-sided embolic stroke or impending occlusion and subsequent stroke.  I recommended right carotid endarterectomy to help prevent further stroke events.    I had a very detailed, thorough discussion with the patient regarding the severity of his carotid disease and that he is at high risk of stroke.  I explained the primary purpose of this operation is to prevent stroke from the plaque in the carotid artery.  I explained the details of the operation including neuro monitoring and possible use of a shunt.  I discussed the alternatives of  optimal medical management or stenting, although carotid endarterectomy is preferable in the setting of such severe plaque and in someone who is acceptable risk for surgery.  I discussed the potential risks, including but not limited to bleeding, infection, injury to vessels or nerves, and risks of anesthesia. I explained the risk that the operation itself can cause a stroke, although the risk of stroke from the operation is less than the risk of stroke if the plaque is not treated, and thus carotid endarterectomy is recommended.  All of their questions were answered. Patient understands and agrees to proceed.    Procedure Summary:  Following informed consent, patient was brought to the OR where general anesthesia was administered. Patient was positioned, neuromointoring was put in place and the right neck was prepped and draped in the usual sterile fashion.  Timeout was called to identify the correct patient, team and equipment.  Everyone was in agreement.  Procedure began with injection of local anesthesia along the right SCM and then a longitudinal incision was made and carried down through each layer using cautery to assure hemostasis.  The common facial vein was identified and ligated.  The common, external and internal carotid were identified and dissected circumferentially proximally and distally and encircled with loops.    Patient was systemically heparinized and then the artery was clamped, with the ICA being applied first.  The carotid was opened with a longitudinal arteriotomy and a coarse irregular plaque plaque was seen.  An argyle shunt was inserted first into the ICA and then the common was flushed and the shunt inserted. Endarterectomy of the plaque was performed and the distal ICA endpoint was tacked with prolene sutures.  Once complete, a bovine pericardial patch was sutured with a running 6-0 prolene suture.  At this pont the shunt was removed, the artery was flushed and copiously irrigated, the  suture line was completed.  The clamps were removed, with the ICA being removed last, and flow was restored.  There was a continuous flow signal in the ICA at this point.  The heparin was reversed with protamine. Topical hemostatic was applied.  A 7 flat WENDY drain was placed and secured with a nylon suture.     At this point we had good hemostasis.  The platysma was reapproximated with interrupted vicryl sutures.  The skin was reapproximated with a running subcuticular suture.  A sterile dressing was placed. Patient was extubated and returned to PACU in stable condition.  All counts were correct at the conclusion of the case.       Postoperative plan:  Patient will be maintained on plavix for 3 months postoperatively to minimize the risk of additional strokes from the left ICA.  He will follow-up with me in my outpatient clinic for ongoing surveillance.    Russel Torres MD  General and Vascular Surgery  [facility]  Cell: [phone]
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
## 2021-01-03 NOTE — ANESTHESIA PROCEDURE NOTES
Peripheral IV    Date/Time: 1/3/2021 1:47 PM  Performed by: Rafi Watson M.D.  Authorized by: Rafi Watson M.D.     Size:  18 G  Laterality:  Left  Local Anesthetic:  None  Site Prep:  Alcohol  Technique:  Direct puncture  Attempts:  1

## 2021-01-03 NOTE — DISCHARGE PLANNING
Acute Rehab Hospital/ Transitional Care Coordination  PT Gait supervised w/ 300 w/o any device   SLP - has SLP needs  OT - pending    Post acute recommendations pending OT eval as pt does not have PT needs and would need 2/3 therapies to meet in pt rehab criteria.     TCC to follow.

## 2021-01-03 NOTE — THERAPY
Physical Therapy   Initial Evaluation     Patient Name: Yuri Smart  Age:  70 y.o., Sex:  male  Medical Record #: [id number]  Today's Date: 1/2/2021     Precautions: Swallow Precautions ( See Comments), Other (See Comments)    Assessment  Patient is 70 y.o. male admitted for stroke w/u presenting with aphasia. PMH includes COPD. Prior to admission, patient lives with wife and is an avid gym- goer. At time of evaluation, with no focal deficits and was IND with bed mobility and SBA for transfers and ambulation x200' with no AD. Patient having difficulty with multitasking with ambulation, however no LOB noted. Wife present during session and stating can take time off work to support patient. Patient's impairments at this time are more cognitive deficits and would defer to OT/SLP recs for discharge planning in this regard. Patient does not require further IP PT     Plan    Recommend Physical Therapy for Evaluation only   DC Equipment Recommendations: (P) None  Discharge Recommendations: (P) Recommend outpatient physical therapy services to address higher level deficits       Objective       01/02/21 1600   Prior Living Situation   Housing / Facility [address]   Steps Into Home 0  (0)   Steps In Home 0   Bathroom Set up Walk In Shower   Equipment Owned None   Lives with - Patient's Self Care Capacity Spouse   Comments [place], wife works x4dy/wk   Prior Level of Functional Mobility   Bed Mobility Independent   Transfer Status Independent   Ambulation Independent   Distance Ambulation (Feet)   (community ambulator)   Assistive Devices Used None   Comments   (avid gym goer)   Gait Analysis   Gait Level Of Assist Supervised   Distance (Feet) 300   # of Times Distance was Traveled 1   Deviation Increased Base Of Support   Weight Bearing Status no restrictions   Bed Mobility    Supine to Sit Independent   Sit to Supine Independent   Scooting Independent   Rolling Independent   Functional Mobility   Sit  to Stand Supervised   Bed, Chair, Wheelchair Transfer Supervised   Mobility no AD   Anticipated Discharge Equipment and Recommendations   DC Equipment Recommendations None   Discharge Recommendations Recommend outpatient physical therapy services to address higher level deficits

## 2021-01-04 LAB — PATHOLOGY CONSULT NOTE: NORMAL

## 2021-01-04 PROCEDURE — 700102 HCHG RX REV CODE 250 W/ 637 OVERRIDE(OP): Performed by: PSYCHIATRY & NEUROLOGY

## 2021-01-04 PROCEDURE — A9270 NON-COVERED ITEM OR SERVICE: HCPCS | Performed by: PSYCHIATRY & NEUROLOGY

## 2021-01-04 PROCEDURE — 700102 HCHG RX REV CODE 250 W/ 637 OVERRIDE(OP): Performed by: SURGERY

## 2021-01-04 PROCEDURE — A9270 NON-COVERED ITEM OR SERVICE: HCPCS | Performed by: HOSPITALIST

## 2021-01-04 PROCEDURE — 97165 OT EVAL LOW COMPLEX 30 MIN: CPT

## 2021-01-04 PROCEDURE — 770020 HCHG ROOM/CARE - TELE (206)

## 2021-01-04 PROCEDURE — 700102 HCHG RX REV CODE 250 W/ 637 OVERRIDE(OP): Performed by: HOSPITALIST

## 2021-01-04 PROCEDURE — A9270 NON-COVERED ITEM OR SERVICE: HCPCS | Performed by: SURGERY

## 2021-01-04 PROCEDURE — 99233 SBSQ HOSP IP/OBS HIGH 50: CPT | Performed by: INTERNAL MEDICINE

## 2021-01-04 PROCEDURE — 700111 HCHG RX REV CODE 636 W/ 250 OVERRIDE (IP): Performed by: SURGERY

## 2021-01-04 PROCEDURE — 700105 HCHG RX REV CODE 258: Performed by: SURGERY

## 2021-01-04 RX ORDER — AMOXICILLIN 250 MG
2 CAPSULE ORAL DAILY
Status: SHIPPED
Start: 2021-01-04 | End: 2021-04-14

## 2021-01-04 RX ORDER — TRAZODONE HYDROCHLORIDE 50 MG/1
50 TABLET ORAL
Qty: 30 TAB | Refills: 3 | Status: SHIPPED
Start: 2021-01-04 | End: 2021-02-03

## 2021-01-04 RX ORDER — ASPIRIN 81 MG/1
81 TABLET ORAL DAILY
Qty: 30 TAB | Status: SHIPPED
Start: 2021-01-05

## 2021-01-04 RX ORDER — CLOPIDOGREL BISULFATE 75 MG/1
75 TABLET ORAL DAILY
Qty: 60 TAB | Refills: 3 | Status: SHIPPED
Start: 2021-01-05 | End: 2021-01-06 | Stop reason: SDUPTHER

## 2021-01-04 RX ORDER — ACETAMINOPHEN 325 MG/1
650 TABLET ORAL EVERY 6 HOURS PRN
Qty: 30 TAB | Refills: 0 | Status: SHIPPED
Start: 2021-01-04 | End: 2021-01-05

## 2021-01-04 RX ORDER — POLYETHYLENE GLYCOL 3350 17 G/17G
17 POWDER, FOR SOLUTION ORAL
Refills: 3 | Status: SHIPPED
Start: 2021-01-04 | End: 2021-02-03

## 2021-01-04 RX ORDER — SODIUM CHLORIDE 9 MG/ML
1000 INJECTION, SOLUTION INTRAVENOUS ONCE
Status: COMPLETED | OUTPATIENT
Start: 2021-01-04 | End: 2021-01-04

## 2021-01-04 RX ORDER — ATORVASTATIN CALCIUM 80 MG/1
80 TABLET, FILM COATED ORAL EVERY EVENING
Qty: 30 TAB | Status: SHIPPED
Start: 2021-01-04 | End: 2021-01-06 | Stop reason: SDUPTHER

## 2021-01-04 RX ADMIN — ACETAMINOPHEN 650 MG: 325 TABLET, FILM COATED ORAL at 23:43

## 2021-01-04 RX ADMIN — ACETAMINOPHEN 650 MG: 325 TABLET, FILM COATED ORAL at 06:12

## 2021-01-04 RX ADMIN — KETOROLAC TROMETHAMINE 15 MG: 30 INJECTION, SOLUTION INTRAMUSCULAR at 00:56

## 2021-01-04 RX ADMIN — ACETAMINOPHEN 650 MG: 325 TABLET, FILM COATED ORAL at 16:50

## 2021-01-04 RX ADMIN — ASPIRIN 81 MG: 81 TABLET, COATED ORAL at 06:12

## 2021-01-04 RX ADMIN — CLOPIDOGREL BISULFATE 75 MG: 75 TABLET ORAL at 06:12

## 2021-01-04 RX ADMIN — ACETAMINOPHEN 650 MG: 325 TABLET, FILM COATED ORAL at 00:42

## 2021-01-04 RX ADMIN — ACETAMINOPHEN 650 MG: 325 TABLET, FILM COATED ORAL at 12:55

## 2021-01-04 RX ADMIN — KETOROLAC TROMETHAMINE 15 MG: 30 INJECTION, SOLUTION INTRAMUSCULAR at 12:56

## 2021-01-04 RX ADMIN — ATORVASTATIN CALCIUM 80 MG: 80 TABLET, FILM COATED ORAL at 16:49

## 2021-01-04 RX ADMIN — KETOROLAC TROMETHAMINE 15 MG: 30 INJECTION, SOLUTION INTRAMUSCULAR at 20:12

## 2021-01-04 RX ADMIN — ENOXAPARIN SODIUM 40 MG: 40 INJECTION SUBCUTANEOUS at 06:11

## 2021-01-04 RX ADMIN — DOCUSATE SODIUM 50 MG AND SENNOSIDES 8.6 MG 2 TABLET: 8.6; 5 TABLET, FILM COATED ORAL at 06:12

## 2021-01-04 RX ADMIN — DOCUSATE SODIUM 50 MG AND SENNOSIDES 8.6 MG 2 TABLET: 8.6; 5 TABLET, FILM COATED ORAL at 16:50

## 2021-01-04 RX ADMIN — SODIUM CHLORIDE 1000 ML: 9 INJECTION, SOLUTION INTRAVENOUS at 00:42

## 2021-01-04 RX ADMIN — KETOROLAC TROMETHAMINE 15 MG: 30 INJECTION, SOLUTION INTRAMUSCULAR at 08:01

## 2021-01-04 ASSESSMENT — COGNITIVE AND FUNCTIONAL STATUS - GENERAL
SUGGESTED CMS G CODE MODIFIER DAILY ACTIVITY: CK
DRESSING REGULAR UPPER BODY CLOTHING: A LITTLE
EATING MEALS: A LITTLE
HELP NEEDED FOR BATHING: A LITTLE
DAILY ACTIVITIY SCORE: 18
DRESSING REGULAR LOWER BODY CLOTHING: A LITTLE
PERSONAL GROOMING: A LITTLE
TOILETING: A LITTLE

## 2021-01-04 ASSESSMENT — ACTIVITIES OF DAILY LIVING (ADL): TOILETING: INDEPENDENT

## 2021-01-04 NOTE — PROGRESS NOTES
Patient transferred to [facility] with transport. VSS, on 2L via NC. No patient belongings, chart on hospital bed. O2 tank full for transport.

## 2021-01-04 NOTE — PROGRESS NOTES
"Vascular    Events  1/4/2021: JUDITH, pain controlled, neuro stable - still having expressive aphasia    Vitals  /58   Pulse 73   Temp 36.2 °C (97.2 °F) (Temporal)   Resp 17   Ht 1.803 m (5' 11\")   Wt 96 kg (211 lb 10.3 oz)   SpO2 93%   BMI 29.52 kg/m²     Exam  Neuro exam stable - still having expressive aphasia  Minimal swelling, minimal oozing  Drain removed and bandage replaced    Labs  Unremarkable    A/P)  POD 1 from right carotid endarterectomy  Doing well  Up as tolerated  Home/rehab anytime from surgical standpoint  FU 2 weeks in clinic for progress check    Antithrombotic regimen: ASA 81mg daily indefinitely and Plavix 75mg daily for 6 months (until July 2021)    Russel Torres MD  [facility] (General and Vascular Surgery)  Cell: [phone] (text or call is fine, if you don't reach me please try my office)  Office: [phone]  __________________________________________________________________  Patient:Yuri Smart   MRN:[id number]   CSN:[id number]    "

## 2021-01-04 NOTE — THERAPY
Occupational Therapy   Initial Evaluation     Patient Name: Yuri Smart  Age:  70 y.o., Sex:  male  Medical Record #: [id number]  Today's Date: 1/4/2021     Precautions  Precautions: Fall Risk, Swallow Precautions ( See Comments)  Comments: aphasic    Assessment  Patient is 70 y.o. male with a diagnosis of L MCA CVA w/ aphasia. Now s/p R carotid endarterectomy.  Main deficit appears to be related to speech, however question higher level cognitive deficits. Would recommend 24/7 supervision initially upon discharge to ensure safety w/ med mgmt and higher level IADLs.     Plan    Patient will not be actively followed for occupational therapy services at this time, however may be seen if requested by physician for 1 more visit within 30 days to address any discharge or equipment needs.       DC Equipment Recommendations: None  Discharge Recommendations: Recommend home health for continued occupational therapy services and 24/7 supv initially.    Subjective    Pleasant and cooperative, word salad     Objective       01/04/21 1436   Cognition    Cognition / Consciousness X   Speech/ Communication Expressive Aphasia;Receptive Aphasia   Level of Consciousness Alert   Comments pleasant and cooperative, nonsensical word salad speech during, able to get words out with extra time and cues   Bed Mobility    Supine to Sit Supervised   Sit to Supine Supervised   Scooting Supervised   ADL Assessment   Grooming Supervision;Standing  (brush teeth, comb hair)   Lower Body Dressing Supervision  (don socks)   Functional Mobility   Sit to Stand Supervised   Bed, Chair, Wheelchair Transfer Supervised   Mobility EOB>sink>blevins>Chair   Comments no AD   Visual Perception   Visual Perception  WDL

## 2021-01-04 NOTE — OR NURSING
Patient recovered well in post-op. AAO status RASHIDA d/t  expressive aphasia. VSS, on 2L via NC. Surgical sites RASHIDA, surgical dressing in place intact with small amount of serosanguinous drainage. Surgical pain managed through IV medications. Anxiety and nausea managed through medication. Patient able to intake fluids without nausea. Spouse updated and discussed POC. No patient belongings in recovery. Report called to TORY Gambino. Awaiting transport. O2 tank full for transport.

## 2021-01-04 NOTE — ANESTHESIA TIME REPORT
Anesthesia Start and Stop Event Times     Date Time Event    1/3/2021 1320 Ready for Procedure     1329 Anesthesia Start     1531 Anesthesia Stop        Responsible Staff  01/03/21    Name Role Begin End    Rafi Watson M.D. Anesth 1329 1531        Preop Diagnosis (Free Text):  Pre-op Diagnosis     right carotid stenosis        Preop Diagnosis (Codes):    Post op Diagnosis  Carotid stenosis, non-symptomatic, right      Premium Reason  E. Weekend    Comments:

## 2021-01-04 NOTE — DISCHARGE PLANNING
Follow up for rehab patient is post right carotid endarterectomy with neuromonitoring.   RPG to consult per protocol.

## 2021-01-04 NOTE — ANESTHESIA POSTPROCEDURE EVALUATION
Patient: Yuri Smatr    Procedure Summary     Date: 01/03/21 Room / Location: [place] [address] / SURGERY [facility]    Anesthesia Start: 1329 Anesthesia Stop: 1531    Procedure: ENDARTERECTOMY, CAROTID (Right Neck) Diagnosis: (right carotid stenosis)    Surgeons: Russel Torres M.D. Responsible Provider: Rafi Watson M.D.    Anesthesia Type: general ASA Status: 4          Final Anesthesia Type: general  Last vitals  BP   Blood Pressure : 119/68, Arterial BP: 110/62    Temp   36.8 °C (98.2 °F)    Pulse   Pulse: 84   Resp   12    SpO2   96 %      Anesthesia Post Evaluation    Patient location during evaluation: PACU  Patient participation: complete - patient participated  Level of consciousness: confused and awake  Pain scale: confused.    Airway patency: patent  Anesthetic complications: no  Cardiovascular status: hemodynamically stable  Respiratory status: acceptable  Hydration status: euvolemic    PONV: none           Nurse Pain Score: 0 (NPRS)

## 2021-01-04 NOTE — DISCHARGE PLANNING
TCC   Discussed with Dr. Samuels Physiatrist.   Pt is at a supervised level with PT - 300 feet w no device.   OT - July CARDENAS will see pt today.     [place] pt will not meet in pt IRF criteria  Will confirm after OT evals pt.      Addendum/  Reviewed OT notes; pt is at a SUPERVISED.   Therefore, this referral will not be forwarded to Physiatry for consult as he does not meet IRF criteria.     Post acute recommedations are for Home health.     Thank you for referral.
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
## 2021-01-05 ENCOUNTER — PATIENT OUTREACH (OUTPATIENT)
Dept: HEALTH INFORMATION MANAGEMENT | Facility: OTHER | Age: 71
End: 2021-01-05

## 2021-01-05 VITALS
HEIGHT: 71 IN | DIASTOLIC BLOOD PRESSURE: 84 MMHG | TEMPERATURE: 97.1 F | OXYGEN SATURATION: 96 % | BODY MASS INDEX: 29.63 KG/M2 | RESPIRATION RATE: 16 BRPM | SYSTOLIC BLOOD PRESSURE: 140 MMHG | WEIGHT: 211.64 LBS | HEART RATE: 88 BPM

## 2021-01-05 PROBLEM — I10 HYPERTENSION: Status: ACTIVE | Noted: 2021-01-05

## 2021-01-05 PROCEDURE — 700102 HCHG RX REV CODE 250 W/ 637 OVERRIDE(OP): Performed by: SURGERY

## 2021-01-05 PROCEDURE — 700102 HCHG RX REV CODE 250 W/ 637 OVERRIDE(OP): Performed by: INTERNAL MEDICINE

## 2021-01-05 PROCEDURE — 700111 HCHG RX REV CODE 636 W/ 250 OVERRIDE (IP): Performed by: SURGERY

## 2021-01-05 PROCEDURE — A9270 NON-COVERED ITEM OR SERVICE: HCPCS | Performed by: HOSPITALIST

## 2021-01-05 PROCEDURE — A9270 NON-COVERED ITEM OR SERVICE: HCPCS | Performed by: PSYCHIATRY & NEUROLOGY

## 2021-01-05 PROCEDURE — 92526 ORAL FUNCTION THERAPY: CPT

## 2021-01-05 PROCEDURE — 700102 HCHG RX REV CODE 250 W/ 637 OVERRIDE(OP): Performed by: HOSPITALIST

## 2021-01-05 PROCEDURE — A9270 NON-COVERED ITEM OR SERVICE: HCPCS | Performed by: INTERNAL MEDICINE

## 2021-01-05 PROCEDURE — 700102 HCHG RX REV CODE 250 W/ 637 OVERRIDE(OP): Performed by: PSYCHIATRY & NEUROLOGY

## 2021-01-05 PROCEDURE — 99238 HOSP IP/OBS DSCHRG MGMT 30/<: CPT | Performed by: INTERNAL MEDICINE

## 2021-01-05 PROCEDURE — 97535 SELF CARE MNGMENT TRAINING: CPT

## 2021-01-05 PROCEDURE — A9270 NON-COVERED ITEM OR SERVICE: HCPCS | Performed by: SURGERY

## 2021-01-05 RX ORDER — LISINOPRIL 10 MG/1
10 TABLET ORAL DAILY
Qty: 30 TAB | Refills: 0 | Status: SHIPPED | OUTPATIENT
Start: 2021-01-06 | End: 2021-04-14

## 2021-01-05 RX ORDER — LISINOPRIL 10 MG/1
10 TABLET ORAL
Status: DISCONTINUED | OUTPATIENT
Start: 2021-01-05 | End: 2021-01-05 | Stop reason: HOSPADM

## 2021-01-05 RX ORDER — BUDESONIDE AND FORMOTEROL FUMARATE DIHYDRATE 160; 4.5 UG/1; UG/1
2 AEROSOL RESPIRATORY (INHALATION) 2 TIMES DAILY
Status: DISCONTINUED | OUTPATIENT
Start: 2021-01-05 | End: 2021-01-05 | Stop reason: HOSPADM

## 2021-01-05 RX ADMIN — KETOROLAC TROMETHAMINE 15 MG: 30 INJECTION, SOLUTION INTRAMUSCULAR at 07:43

## 2021-01-05 RX ADMIN — ACETAMINOPHEN 650 MG: 325 TABLET, FILM COATED ORAL at 13:27

## 2021-01-05 RX ADMIN — ATORVASTATIN CALCIUM 80 MG: 80 TABLET, FILM COATED ORAL at 16:57

## 2021-01-05 RX ADMIN — KETOROLAC TROMETHAMINE 15 MG: 30 INJECTION, SOLUTION INTRAMUSCULAR at 02:39

## 2021-01-05 RX ADMIN — ACETAMINOPHEN 650 MG: 325 TABLET, FILM COATED ORAL at 16:57

## 2021-01-05 RX ADMIN — ASPIRIN 81 MG: 81 TABLET, COATED ORAL at 05:19

## 2021-01-05 RX ADMIN — ENOXAPARIN SODIUM 40 MG: 40 INJECTION SUBCUTANEOUS at 05:19

## 2021-01-05 RX ADMIN — ACETAMINOPHEN 650 MG: 325 TABLET, FILM COATED ORAL at 05:19

## 2021-01-05 RX ADMIN — LISINOPRIL 10 MG: 10 TABLET ORAL at 16:57

## 2021-01-05 RX ADMIN — CLOPIDOGREL BISULFATE 75 MG: 75 TABLET ORAL at 05:19

## 2021-01-05 RX ADMIN — KETOROLAC TROMETHAMINE 15 MG: 30 INJECTION, SOLUTION INTRAMUSCULAR at 13:26

## 2021-01-05 NOTE — THERAPY
Occupational Therapy Contact Note    Discussed med mgmt and safety during IADLs at home. He reports wife will help manage meds and assist with cooking. Anticipate with support for IADLs from his wife he is functionally capable of d/c home.    July Thayer, OTR/L

## 2021-01-05 NOTE — PROGRESS NOTES
Monitor Summary: Sinus rhythm/sinus tach, HR , FL 0.18, QRS 0.08, QT 0.38 with rare PVCs per strip from monitor room.

## 2021-01-05 NOTE — FACE TO FACE
Face to Face Supporting Documentation - Home Health    The encounter with this patient was in whole or in part the primary reason for home health admission.    Date of encounter:   Patient:                    MRN:                       YOB: 2020  Yuri Smart  [id number]  1950     Home health to see patient for:  Occupational therapy evaluation and treatment    Skilled need for:  Comment: recent CVA    Skilled nursing interventions to include:  Comment: initial 24/7 supervision as per OT recommendations    Homebound status evidenced by:  Needs the assistance of another person in order to leave the home. Leaving home requires a considerable and taxing effort. There is a normal inability to leave the home.    Community Physician to provide follow up care: Obdulio Sampson M.D.     Optional Interventions? Yes, Details: As per OT recommendations      I certify the face to face encounter for this home health care referral meets the CMS requirements and the encounter/clinical assessment with the patient was, in whole, or in part, for the medical condition(s) listed above, which is the primary reason for home health care. Based on my clinical findings: the service(s) are medically necessary, support the need for home health care, and the homebound criteria are met.  I certify that this patient has had a face to face encounter by myself.  Margarito Medina M.D. - NPI: [id number]

## 2021-01-05 NOTE — THERAPY
"Speech Language Pathology  Daily Treatment     Patient Name: Yuri Smart  Age:  70 y.o., Sex:  male  Medical Record #: [id number]  Today's Date: 1/5/2021     Precautions  Precautions: Fall Risk, Swallow Precautions ( See Comments)  Comments: aphasic    Assessment     The patient was seen on this date for dysphagia treatment. He was provided PO trials of thin liquids, soft and bite sized trials and trials of regular textures. The patient consumed all textures without any overt s/sx of aspiration. The patient tolerated heaping bites of soft solids and sequential sips of thin liquids without any change in vocal quality and sx of aspiration. Oral cavity was completely clear following each bite of food provided. The patient continues to demonstrate moderate word-finding deficits. Recommend the patient pursue outpatient therapy to fully assess language deficits. Recommend initiation of regular textures with thin liquids.     Plan    Continue current treatment plan.    Discharge Recommendations: Recommend outpatient speech therapy services     Objective       01/05/21 1418   Dysphagia    Diet / Liquid Recommendation Thin (0);Regular (7)   Nutritional Liquid Intake Rating Scale Non thickened beverages   Nutritional Food Intake Rating Scale Total oral diet with no restrictions   Nursing Communication Swallow Precaution Sign Posted at Head of Bed   Recommended Route of Medication Administration   Medication Administration  Whole with Liquid Wash   Patient / Family Goals   Patient / Family Goal #1 \"My speech.\"   Goal #1 Outcome Progressing slower than expected   Short Term Goals   Short Term Goal # 1 Pt will consume MM5/MT2 with monitoring during meals and use of posted and recommended swallow strategies.    Goal Outcome # 1 Goal met, new goal added   Short Term Goal # 1 B  Pt will consume regular textures with thin liquids without any overt s/sx of aspiration          "

## 2021-01-05 NOTE — CARE PLAN
Problem: Communication  Goal: The ability to communicate needs accurately and effectively will improve  Outcome: PROGRESSING AS EXPECTED  Note: History of CVA. Expressive aphasia present. Aphasia appears to improve when patient is more alert.     Problem: Safety  Goal: Will remain free from injury  Outcome: PROGRESSING AS EXPECTED  Note: History of CVA. Safety precautions in place. Patient will use call bell appropriatly before attempting to ambulate independently. Occasionally, patient will exit bed without calling for assistance. Bed alarm is on.

## 2021-01-06 NOTE — DISCHARGE INSTRUCTIONS
- Discharge Instructions to Pt:  · Follow up with your Primary Care Physician in 1 week  · Follow up with your specalist Dr Torres in 2 weeks  · Follow up with your neurologist in 1 week  · Be compliant with your follow up appointments.  · Please be compliant with your medications, including new ones.  · A new medication has been given please take as advised.  · Keep blood pressure controlled and be compliant to keep systolic blood pressure <140.  · Please adhere to a healthy diet, that consist of low fat, low salt, low calorie.  · Weight loss and physical activity as tolerated is encouraged.   · Ambulate with assistance.  ·  If there any signs or symptoms of worsening or symptoms recurring, please call your Primary Care Physician or return to Emergency Department for further assessment.  · Avoid sudden changes in position, like standing up quickly.  · Do not drive until cleared by PCP.  · Refrain from drinking alcohol and smoking.     Recommendation to PCP:  · Would recommend a CBC, BMP in 3-5 days.  · Your pt will need close monitoring.  · If  failure to respond to therapy, we suggest having patient return for further care, or if cannot be treated as an outpatient, return to ED if worsening of symptoms.  · Please make certain your pt follows up with specialist appointments  · Ensure patient adheres to diet and lifestyle modifications and reconcile.  · Please note the change to medication and reconcile.  · Patient without progression of symptoms. Prognosis and risk factors discussed in detail with patient and he understands.              Discharge Instructions    Discharged to home by car with relative. Discharged via wheelchair, hospital escort: Yes.  Special equipment needed: Not Applicable    Be sure to schedule a follow-up appointment with your primary care doctor or any specialists as instructed.     Discharge Plan:   Diet Plan: Discussed  Activity Level: Discussed  Confirmed Follow up Appointment:  Appointment Scheduled  Confirmed Symptoms Management: Discussed  Medication Reconciliation Updated: Yes  Influenza Vaccine Indication: Patient Refuses    I understand that a diet low in cholesterol, fat, and sodium is recommended for good health. Unless I have been given specific instructions below for another diet, I accept this instruction as my diet prescription.   Other diet: Regular    Special Instructions: None    · Is patient discharged on Warfarin / Coumadin?   No     Depression / Suicide Risk    As you are discharged from this [facility] facility, it is important to learn how to keep safe from harming yourself.    Recognize the warning signs:  · Abrupt changes in personality, positive or negative- including increase in energy   · Giving away possessions  · Change in eating patterns- significant weight changes-  positive or negative  · Change in sleeping patterns- unable to sleep or sleeping all the time   · Unwillingness or inability to communicate  · Depression  · Unusual sadness, discouragement and loneliness  · Talk of wanting to die  · Neglect of personal appearance   · Rebelliousness- reckless behavior  · Withdrawal from people/activities they love  · Confusion- inability to concentrate     If you or a loved one observes any of these behaviors or has concerns about self-harm, here's what you can do:  · Talk about it- your feelings and reasons for harming yourself  · Remove any means that you might use to hurt yourself (examples: pills, rope, extension cords, firearm)  · Get professional help from the community (Mental Health, Substance Abuse, psychological counseling)  · Do not be alone:Call your Safe Contact- someone whom you trust who will be there for you.  · Call your local CRISIS HOTLINE 192-1861 or 047-578-1558  · Call your local Children's Mobile Crisis Response Team [place] (656) 154-9319 or www.[employer]  · Call the toll free National Suicide Prevention Hotlines   · National  Suicide Prevention [facility] 833-984-KQYK (0958)  · [facility] 800-SUICIDE (234-7945)    Atorvastatin tablets  What is this medicine?  ATORVASTATIN (a TORE va sta tin) is known as a HMG-CoA reductase inhibitor or 'statin'. It lowers the level of cholesterol and triglycerides in the blood. This drug may also reduce the risk of heart attack, stroke, or other health problems in patients with risk factors for heart disease. Diet and lifestyle changes are often used with this drug.  This medicine may be used for other purposes; ask your health care provider or pharmacist if you have questions.  COMMON BRAND NAME(S): Lipitor  What should I tell my health care provider before I take this medicine?  They need to know if you have any of these conditions:  · diabetes  · if you often drink alcohol  · history of stroke  · kidney disease  · liver disease  · muscle aches or weakness  · thyroid disease  · an unusual or allergic reaction to atorvastatin, other medicines, foods, dyes, or preservatives  · pregnant or trying to get pregnant  · breast-feeding  How should I use this medicine?  Take this medicine by mouth with a glass of water. Follow the directions on the prescription label. You can take it with or without food. If it upsets your stomach, take it with food. Do not take with grapefruit juice. Take your medicine at regular intervals. Do not take it more often than directed. Do not stop taking except on your doctor's advice.  Talk to your pediatrician regarding the use of this medicine in children. While this drug may be prescribed for children as young as 10 for selected conditions, precautions do apply.  Overdosage: If you think you have taken too much of this medicine contact a poison control center or emergency room at once.  NOTE: This medicine is only for you. Do not share this medicine with others.  What if I miss a dose?  If you miss a dose, take it as soon as you can. If your next dose is to be  taken in less than 12 hours, then do not take the missed dose. Take the next dose at your regular time. Do not take double or extra doses.  What may interact with this medicine?  Do not take this medicine with any of the following medications:  · dasabuvir; ombitasvir; paritaprevir; ritonavir  · ombitasvir; paritaprevir; ritonavir  · posaconazole  · red yeast rice  This medicine may also interact with the following medications:  · alcohol  · birth control pills  · certain antibiotics like erythromycin and clarithromycin  · certain antivirals for HIV or hepatitis  · certain medicines for cholesterol like fenofibrate, gemfibrozil, and niacin  · certain medicines for fungal infections like ketoconazole and itraconazole  · colchicine  · cyclosporine  · digoxin  · grapefruit juice  · rifampin  This list may not describe all possible interactions. Give your health care provider a list of all the medicines, herbs, non-prescription drugs, or dietary supplements you use. Also tell them if you smoke, drink alcohol, or use illegal drugs. Some items may interact with your medicine.  What should I watch for while using this medicine?  Visit your doctor or health care professional for regular check-ups. You may need regular tests to make sure your liver is working properly.  Your health care professional may tell you to stop taking this medicine if you develop muscle problems. If your muscle problems do not go away after stopping this medicine, contact your health care professional.  Do not become pregnant while taking this medicine. Women should inform their health care professional if they wish to become pregnant or think they might be pregnant. There is a potential for serious side effects to an unborn child. Talk to your health care professional or pharmacist for more information. Do not breast-feed an infant while taking this medicine.  This medicine may increase blood sugar. Ask your healthcare provider if changes in diet  or medicines are needed if you have diabetes.  If you are going to need surgery or other procedure, tell your doctor that you are using this medicine.  This drug is only part of a total heart-health program. Your doctor or a dietician can suggest a low-cholesterol and low-fat diet to help. Avoid alcohol and smoking, and keep a proper exercise schedule.  This medicine may cause a decrease in Co-Enzyme Q-10. You should make sure that you get enough Co-Enzyme Q-10 while you are taking this medicine. Discuss the foods you eat and the vitamins you take with your health care professional.  What side effects may I notice from receiving this medicine?  Side effects that you should report to your doctor or health care professional as soon as possible:  · allergic reactions like skin rash, itching or hives, swelling of the face, lips, or tongue  · fever  · joint pain  · loss of memory  · redness, blistering, peeling or loosening of the skin, including inside the mouth  · signs and symptoms of high blood sugar such as being more thirsty or hungry or having to urinate more than normal. You may also feel very tired or have blurry vision.  · signs and symptoms of liver injury like dark yellow or brown urine; general ill feeling or flu-like symptoms; light-belly pain; unusually weak or tired; yellowing of the eyes or skin  · signs and symptoms of muscle injury like dark urine; trouble passing urine or change in the amount of urine; unusually weak or tired; muscle pain or side or back pain  Side effects that usually do not require medical attention (report to your doctor or health care professional if they continue or are bothersome):  · diarrhea  · nausea  · stomach pain  · trouble sleeping  · upset stomach  This list may not describe all possible side effects. Call your doctor for medical advice about side effects. You may report side effects to FDA at 5-410-GYQ-2297.  Where should I keep my medicine?  Keep out of the reach of  children.  Store between 20 and 25 degrees C (68 and 77 degrees F). Throw away any unused medicine after the expiration date.  NOTE: This sheet is a summary. It may not cover all possible information. If you have questions about this medicine, talk to your doctor, pharmacist, or health care provider.  © 2020 [employer]/Gold Standard (2019-10-09 11:36:16)    Clopidogrel tablets  What is this medicine?  CLOPIDOGREL (kloh PID oh grel) helps to prevent blood clots. This medicine is used to prevent heart attack, stroke, or other vascular events in people who are at high risk.  This medicine may be used for other purposes; ask your health care provider or pharmacist if you have questions.  COMMON BRAND NAME(S): Plavix  What should I tell my health care provider before I take this medicine?  They need to know if you have any of the following conditions:  · bleeding disorders  · bleeding in the brain  · having surgery  · history of stomach bleeding  · an unusual or allergic reaction to clopidogrel, other medicines, foods, dyes, or preservatives  · pregnant or trying to get pregnant  · breast-feeding  How should I use this medicine?  Take this medicine by mouth with a glass of water. Follow the directions on the prescription label. You may take this medicine with or without food. If it upsets your stomach, take it with food. Take your medicine at regular intervals. Do not take it more often than directed. Do not stop taking except on your doctor's advice.  A special MedGuide will be given to you by the pharmacist with each prescription and refill. Be sure to read this information carefully each time.  Talk to your pediatrician regarding the use of this medicine in children. Special care may be needed.  Overdosage: If you think you have taken too much of this medicine contact a poison control center or emergency room at once.  NOTE: This medicine is only for you. Do not share this medicine with others.  What if I miss a  dose?  If you miss a dose, take it as soon as you can. If it is almost time for your next dose, take only that dose. Do not take double or extra doses.  What may interact with this medicine?  Do not take this medicine with the following medications:  · dasabuvir; ombitasvir; paritaprevir; ritonavir  · defibrotide  · selexipag  This medicine may also interact with the following medications:  · certain medicines that treat or prevent blood clots like warfarin  · narcotic medicines for pain  · NSAIDs, medicines for pain and inflammation, like ibuprofen or naproxen  · repaglinide  · SNRIs, medicines for depression, like desvenlafaxine, duloxetine, levomilnacipran, venlafaxine  · SSRIs, medicines for depression, like citalopram, escitalopram, fluoxetine, fluvoxamine, paroxetine, sertraline  · stomach acid blockers like cimetidine, esomeprazole, omeprazole  This list may not describe all possible interactions. Give your health care provider a list of all the medicines, herbs, non-prescription drugs, or dietary supplements you use. Also tell them if you smoke, drink alcohol, or use illegal drugs. Some items may interact with your medicine.  What should I watch for while using this medicine?  Visit your doctor or health care professional for regular check-ups. Do not stop taking your medicine unless your doctor tells you to.  Notify your doctor or health care professional and seek emergency treatment if you develop breathing problems; changes in vision; chest pain; severe, sudden headache; pain, swelling, warmth in the leg; trouble speaking; sudden numbness or weakness of the face, arm or leg. These can be signs that your condition has gotten worse.  If you are going to have surgery or dental work, tell your doctor or health care professional that you are taking this medicine.  Certain genetic factors may reduce the effect of this medicine. Your doctor may use genetic tests to determine treatment.  Only take aspirin if you  are instructed to. Low doses of aspirin are used with this medicine to treat some conditions. Taking aspirin with this medicine can increase your risk of bleeding so you must be careful. Talk to your doctor or pharmacist if you have questions.  What side effects may I notice from receiving this medicine?  Side effects that you should report to your doctor or health care professional as soon as possible:  · allergic reactions like skin rash, itching or hives, swelling of the face, lips, or tongue  · signs and symptoms of bleeding such as bloody or black, tarry stools; red or dark-brown urine; spitting up blood or brown material that looks like coffee grounds; red spots on the skin; unusual bruising or bleeding from the eye, gums, or nose  · signs and symptoms of a blood clot such as breathing problems; changes in vision; chest pain; severe, sudden headache; pain, swelling, warmth in the leg; trouble speaking; sudden numbness or weakness of the face, arm or leg  · signs and symptoms of low blood sugar such as feeling anxious; confusion; dizziness; increased hunger; unusually weak or tired; increased sweating; shakiness; cold, clammy skin; irritable; headache; blurred vision; fast heartbeat; loss of consciousness  Side effects that usually do not require medical attention (report to your doctor or health care professional if they continue or are bothersome):  · constipation  · diarrhea  · headache  · upset stomach  This list may not describe all possible side effects. Call your doctor for medical advice about side effects. You may report side effects to FDA at 1-825-FDA-8313.  Where should I keep my medicine?  Keep out of the reach of children.  Store at room temperature of 59 to 86 degrees F (15 to 30 degrees C). Throw away any unused medicine after the expiration date.  NOTE: This sheet is a summary. It may not cover all possible information. If you have questions about this medicine, talk to your doctor, pharmacist,  or health care provider.  © 2020 [employer]/Gold Standard (2019-05-20 15:03:38)    Lisinopril oral solution  What is this medicine?  LISINOPRIL (lyse IN oh pril) is an ACE inhibitor. This medicine is used to treat high blood pressure and heart failure. It is also used to protect the heart immediately after a heart attack.  This medicine may be used for other purposes; ask your health care provider or pharmacist if you have questions.  COMMON BRAND NAME(S): ZEN  What should I tell my health care provider before I take this medicine?  They need to know if you have any of these conditions:  · diabetes  · heart or blood vessel disease  · kidney disease  · low blood pressure  · previous swelling of the tongue, face, or lips with difficulty breathing, difficulty swallowing, hoarseness, or tightening of the throat  · an unusual or allergic reaction to lisinopril, other ACE inhibitors, insect venom, food, dyes, or preservatives  · pregnant or trying to get pregnant  · breast-feeding  How should I use this medicine?  Take this medicine by mouth. Follow the directions on the prescription label. Use a specially marked spoon or container to measure each dose. Ask your pharmacist if you do not have one. Household spoons are not accurate. You may take this medicine with or without food. If it upsets your stomach, take it with food. Take your medicine at regular intervals. Do not take it more often than directed. Do not stop taking except on your doctor's advice.  Talk to your pediatrician regarding the use of this medicine in children. While this drug may be prescribed for children as young as 6 years for selected conditions, precautions do apply.  Overdosage: If you think you have taken too much of this medicine contact a poison control center or emergency room at once.  NOTE: This medicine is only for you. Do not share this medicine with others.  What if I miss a dose?  If you miss a dose, take it as soon as you can. If it is  almost time for your next dose, take only that dose. Do not take double or extra doses.  What may interact with this medicine?  Do not take this medicine with any of the following medications:  · hymenoptera venom  · sacubitril; valsartan  This medicines may also interact with the following medications:  · aliskiren  · angiotensin receptor blockers, like losartan or valsartan  · certain medicines for diabetes  · diuretics  · everolimus  · gold compounds  · lithium  · NSAIDs, medicines for pain and inflammation, like ibuprofen or naproxen  · potassium salts or supplements  · salt substitutes  · sirolimus  · temsirolimus  This list may not describe all possible interactions. Give your health care provider a list of all the medicines, herbs, non-prescription drugs, or dietary supplements you use. Also tell them if you smoke, drink alcohol, or use illegal drugs. Some items may interact with your medicine.  What should I watch for while using this medicine?  Visit your doctor or health care professional for regular check ups. Check your blood pressure as directed. Ask your doctor what your blood pressure should be, and when you should contact him or her.  Do not treat yourself for coughs, colds, or pain while you are using this medicine without asking your doctor or health care professional for advice. Some ingredients may increase your blood pressure.  Women should inform their doctor if they wish to become pregnant or think they might be pregnant. There is a potential for serious side effects to an unborn child. Talk to your health care professional or pharmacist for more information.  Check with your doctor or health care professional if you get an attack of severe diarrhea, nausea and vomiting, or if you sweat a lot. The loss of too much body fluid can make it dangerous for you to take this medicine.  You may get drowsy or dizzy. Do not drive, use machinery, or do anything that needs mental alertness until you know  how this drug affects you. Do not stand or sit up quickly, especially if you are an older patient. This reduces the risk of dizzy or fainting spells. Alcohol can make you more drowsy and dizzy. Avoid alcoholic drinks.  Avoid salt substitutes unless you are told otherwise by your doctor or health care professional.  What side effects may I notice from receiving this medicine?  Side effects that you should report to your doctor or health care professional as soon as possible:  · allergic reactions like skin rash, itching or hives, swelling of the hands, feet, face, lips, throat, or tongue  · breathing problems  · signs and symptoms of kidney injury like trouble passing urine or change in the amount of urine  · signs and symptoms of increased potassium like muscle weakness; chest pain; or fast, irregular heartbeat  · signs and symptoms of liver injury like dark yellow or brown urine; general ill feeling or flu-like symptoms; light-colored stools; loss of appetite; nausea; right upper belly pain; unusually weak or tired; yellowing of the eyes or skin  · signs and symptoms of low blood pressure like dizziness; feeling faint or lightheaded, falls; unusually weak or tired  · stomach pain with or without nausea and vomiting  Side effects that usually do not require medical attention (report to your doctor or health care professional if they continue or are bothersome):  · changes in taste  · cough  · dizziness  · fever  · headache  · sensitivity to light  This list may not describe all possible side effects. Call your doctor for medical advice about side effects. You may report side effects to FDA at 2-231-YMX-8564.  Where should I keep my medicine?  Keep out of the reach of children.  Store at room temperature between 20 and 25 degrees C (68 to 77 degrees F). Thrown away any unused medicine after the expiration date.  NOTE: This sheet is a summary. It may not cover all possible information. If you have questions about this  medicine, talk to your doctor, pharmacist, or health care provider.  © 2020 [employer]/Gold Standard (2017-02-10 11:41:14)      Carotid Endarterectomy, Care After  This sheet gives you information about how to care for yourself after your procedure. Your health care provider may also give you more specific instructions. If you have problems or questions, contact your health care provider.  What can I expect after the procedure?  After the procedure, it is common to have some pain or an ache in your neck for up to 2 weeks. This is normal.  Follow these instructions at home:  Medicines  · Take over-the-counter and prescription medicines only as told by your health care provider.  · If you are given a blood thinner (anticoagulant) after surgery, take it exactly as told.  · Do not drive for 24 hours if you were given a sedative during your procedure.  · Do not drive or use heavy machinery while taking prescription pain medicine.  Incision care    · Follow instructions from your health care provider about how to take care of your incision. Make sure you:  ? Wash your hands with soap and water before and after you change your bandage (dressing). If soap and water are not available, use hand .  ? Change your dressing as told by your health care provider.  ? Leave stitches (sutures), skin glue, or adhesive strips in place. These skin closures may need to stay in place for 2 weeks or longer. If adhesive strip edges start to loosen and curl up, you may trim the loose edges. Do not remove adhesive strips completely unless your health care provider tells you to do that.  · Check your incision area every day for signs of infection. Check for:  ? Redness, swelling, or pain.  ? Fluid or blood.  ? Warmth.  ? Pus or a bad smell.  · Do not take baths, swim, or use a hot tub until your health care provider approves. Ask your health care provider if you may take showers. You may only be allowed to take sponge  baths.  Activity  · Do not lift anything that is heavier than 10 lb (4.5 kg), or the limit that you are told, until your health care provider says that it is safe.  · Return to your normal activities as told by your health care provider. Ask your health care provider what activities are safe for you.  ? Recovery time varies depending on your age, general health, and other factors. You will likely be able to return to a normal lifestyle within a few weeks. While you recover, you may need help with some activities, such as cleaning the house or shopping.  · Exercise regularly, or as told by your health care provider.  Eating and drinking    · Follow instructions from your health care provider about eating or drinking restrictions.  · Drink enough fluid to keep your urine pale yellow.  · Eat a heart-healthy diet. This includes foods like fresh fruits and vegetables, whole grains, low-fat dairy products, and low-fat (lean) meats. Avoid foods that are:  ? High in salt, saturated fat, or sugar.  ? Canned or highly processed.  ? Fried.  Lifestyle  · If you drink alcohol:  ? Limit how much you use to:  § 0-1 drink a day for women.  § 0-2 drinks a day for men.  ? Be aware of how much alcohol is in your drink. In the [place] one drink equals one 12 oz bottle of beer (355 mL), one 5 oz glass of wine (148 mL), or one 1½ oz glass of hard liquor (44 mL).  · Maintain a healthy weight.  General instructions  · Avoid wearing tight clothing around your neck and your sutures.  · Work with your health care provider to keep your blood pressure under control.  · Do not use any products that contain nicotine or tobacco, such as cigarettes, e-cigarettes, and chewing tobacco. If you need help quitting, ask your health care provider.  · Keep all follow-up visits as told by your health care provider. This is important.  Contact a health care provider if you have:  · Signs of infection, such as:  ? Redness, swelling, or pain around your  "incision.  ? Fluid or blood coming from your incision.  ? Your incision feeling warm to the touch.  ? Pus or a bad smell coming from your incision.  ? A fever.  · A rash.  · Difficulty speaking or you have changes in your voice.  Get help right away if you have:  · Difficulty breathing.  · Chest pain or shortness of breath.  · Any symptoms of a stroke. Certain factors may put you at risk for a stroke even after this procedure. These may include chronic lung disease, chronic kidney disease, narrowed arteries (peripheral arterial disease), previous history of a stroke, and being 60 years of age or older. \"BE FAST\" is an easy way to remember the main warning signs of a stroke:  ? B - Balance. Signs are dizziness, sudden trouble walking, or loss of balance.  ? E - Eyes. Signs are trouble seeing or a sudden change in vision.  ? F - Face. Signs are sudden weakness or numbness of the face, or the face or eyelid drooping on one side.  ? A - Arms. Signs are weakness or numbness in an arm. This happens suddenly and usually on one side of the body.  ? S - Speech. Signs are sudden trouble speaking, slurred speech, or trouble understanding what people say.  ? T - Time. Time to call emergency services. Write down what time symptoms started.  · Other signs of a stroke, such as:  ? A sudden, severe headache with no known cause.  ? Nausea or vomiting.  ? Seizure.  These symptoms may represent a serious problem that is an emergency. Do not wait to see if the symptoms will go away. Get medical help right away. Call your local emergency services (911 in the [place] Do not drive yourself to the hospital.  Summary  · After the procedure, it is common to have some pain or an ache in your neck for up to 2 weeks.  · Follow instructions from your health care provider about how to take care of your incision.  · Take over-the-counter and prescription medicines only as told by your health care provider.  · Do not use any products that contain " nicotine or tobacco, such as cigarettes, e-cigarettes, and chewing tobacco. If you need help quitting, ask your health care provider.  · Keep all follow-up visits as told by your health care provider. This is important.  This information is not intended to replace advice given to you by your health care provider. Make sure you discuss any questions you have with your health care provider.  Document Released: 07/07/2006 Document Revised: 01/27/2020 Document Reviewed: 08/27/2019  Elsevier Patient Education © 2020 [employer]      Supporting Someone After a Stroke  Caregivers provide essential physical and emotional support to people who have had a stroke. If you are supporting someone who has had a stroke, you play an important role in coordinating schedules, helping your loved one to communicate, and helping with the overall rehabilitation plan.  What do I need to know about my loved one's recovery?  Recovering from a stroke can take weeks, months, or years. Some people may be able to return to a normal lifestyle, and other people may have permanent problems with movement (mobility), thinking, behavior, or communication. Understanding your loved one's condition can help you manage the role of caregiver. Your loved one's health care team may rely on you to provide information such as medical history and current medicines.  How can I support my friend or family member?  Planning for discharge from the hospital  Ask to meet with a  or a stroke care coordinator, if one is available. This person can help you to plan for discharge. Before you leave the hospital, make sure you understand:  · The recovery process after a stroke.  · Physical, emotional, behavioral, and other changes that may affect your loved one after a stroke.  · The treatments for stroke, including the medicines that your loved one has to take.  · How to lower the risk of another stroke.  · Diet and exercise changes for your loved  "one.  · Whether you will need extra help at home.  · Whether your loved one will need help using the bathroom, bathing, eating, or doing other activities.  · Changes you have to make at home to make it safe for your loved one.  · Whether you need to get special devices or equipment for your loved one.  General help  · Help your loved one establish a daily routine. This may include setting reminders or having a shared day planner or calendar.  · Encourage rest. Your loved one may need frequent breaks during social situations or other activities.  · Be patient. Your loved one may take longer to complete tasks and to process information.  · When giving instructions, give only one instruction at a time or give step-by-step lists. Multitasking can be difficult after a stroke.  · Offer assistance with household chores or other daily tasks. Make \"freezer meals\" that can be reheated.  · Do not set expectations about your loved one's recovery.  Medical visits  · Gently remind your loved one of tasks and medical visits if he or she is forgetful.  · Provide transportation to and from appointments.  · Attend rehabilitation appointments with your loved one. By being involved in the rehabilitation plan, you can encourage your loved one and help with exercises and therapy activities at home.  Preventing falls    · Follow instructions to prevent falls in your loved one's home. These may include:  ? Installing grab bars in bathrooms and handrails in stairways.  ? Using night-lights in the bedroom, bathroom, or hallways.  ? Removing rugs and mats or making them stick to the floor.  ? Keeping walkways clear by removing cords and clutter from the floor.  Managing finances  · Help to manage your loved one's finances. Talk with a  or legal professional if:  ? Your loved one is unable to return to work and is in need of financial help.  ? You need help paying medical bills.  ? You need to establish guardianship over " finances.  ? You need help with estate planning.  How should I care for myself?  Helping a loved one recover from a stroke can be rewarding, and it can also be challenging and stressful at times. Make sure to care for your own well-being during this time.  Lifestyle  · Rest. Try to get 7-9 hours of uninterrupted sleep each night.  · Eat a balanced diet that includes fresh fruits and vegetables, whole grains, lean proteins, and low-fat dairy.  · Exercise for 30 or more minutes on 5 or more days each week.  · Find ways to manage stress. These may include:  ? Deep breathing, yoga, or meditation.  ? Spending time outdoors.  ? Journaling.  Finding support    · Ask for help. Take a break if you are the primary caregiver to your loved one.  · Spend time with supportive people.  · Join a support group with other caregivers or family members of people who have had a stroke.  · If you experience new or worsening depression or anxiety, seek counseling from a mental health professional.  Where to find more information  You may find more information about supporting someone who has had a stroke from:  · National Stroke Association: www.stroke.org/we-can-help/caregivers-and-family  · American Stroke Association: www.strokeassociation.org/STROKEORG  Summary  · Caregivers provide essential physical and emotional support to people who have had a stroke.  · Before you leave the hospital, make sure you understand how to care for someone who has had a stroke.  · It is normal to have many different emotions while caring for someone who has had a stroke. Make sure to care for your own well-being during this time.  This information is not intended to replace advice given to you by your health care provider. Make sure you discuss any questions you have with your health care provider.  Document Released: 04/06/2018 Document Revised: 04/09/2020 Document Reviewed: 04/06/2018  Elsevier Patient Education © 2020 [employer]vier  [employer]        Aphasia  Aphasia is a language disorder that affects the part of your brain that you use to communicate. Aphasia does not affect your intelligence, but you may have trouble:  · Speaking.  · Understanding speech.  · Reading.  · Writing.  Some people with aphasia may also have trouble with memory or attention. Aphasia can happen to anyone at any age, but it is most common in older adults.  What are the causes?  This condition is caused by damage to the language centers of the brain. Damage may be caused by:  · Stroke. This causes a disruption of blood flow to certain areas of the brain. Stroke is the most common cause of aphasia.  · Traumatic brain injury (TBI).  · Brain tumor.  · Infection of the brain tissues.  · Nervous system disease that gradually gets worse (progressiveneurological disorder), such as dementia or multiple sclerosis (MS).  · Brain surgery.  What are the signs or symptoms?  Symptoms of this condition include:  · Trouble finding the right words.  · Difficulty expressing thoughts and needs through speech.  · Using the wrong words, nonsense words, or jargon.  · Talking in sentences that do not make sense or that are not grammatically correct.  · Being unable to repeat back words and phrases.  · Difficulty expressing ideas through writing.  · Difficulty comprehending when reading.  · Being unable to understand other people's speech.  · Having trouble understanding numbers.  The condition affects people differently. Symptoms may start suddenly or come on gradually, depending on the underlying cause.  How is this diagnosed?  This condition may be diagnosed based on a screening of your ability to communicate as soon as symptoms start, or are medically stable after a stroke or brain injury. Later, a more comprehensive assessment may be conducted either in the hospital or rehabilitation center. The assessment may test your ability to:  · Use speech to communicate your personal needs.  · Use  muscles in your mouth and throat for speaking and swallowing.  · Express ideas with speech or other means of communication, such as hand gestures.  · Make conversation with others across a variety of topics.  · Hear and understand speech.  · Understand and produce written material.  · Manage memory and attention associated with communication.  How is this treated?  Treatment for this condition depends on your needs and abilities. The goal is to help restore your ability to communicate or find ways to manage communication challenges. Common treatments include:  · Speech-language therapy. Part of this may include:  ? Re-building intonation, sentence structure, and vocabulary.  ? Learning other ways to communicate, such as using word books, communication boards, or special software programs.  ? Learning to communicate with writing, sign language, or hand gestures.  ? Using a combination of methods to communicate.  · Working with family members. This may include:  ? Learning ways to communicate.  ? Emotional support.  · Support groups.  · Occupational therapy. This can help to find devices to assist with daily living activities.  Treatment usually begins as soon as possible. It may begin while you are in the hospital and continue in a rehabilitation center or at home. In some cases, aphasia may improve quickly on its own. In other cases, recovery occurs more slowly over time.  Follow these instructions at home:    · Keep all follow-up visits as told by your health care provider. This is important.  · Make sure you have a good support system at home.  · Find a support group. This can help you connect with others who are going through the same thing.  · Try the following tips while communicating:  ? Use short, simple sentences. Ask family members to do the same. Sentences that require one-word or short answers are easiest.  ? Avoid distractions like background noise when trying to listen or talk.  ? Try communicating with  "gestures, pointing, writing, or drawing.  ? Talk slowly. Ask family members to talk to you slowly.  ? Maintain eye contact when communicating. Ask family members to do the same when communicating with you.  ? Ask family members to give you time to respond or to engage in conversation with them.  Contact a health care provider if:  · Your symptoms change or get worse.  · You are struggling with anxiety or depression.  Get help right away if you have:  · Any symptoms of a stroke. \"BE FAST\" is an easy way to remember the main warning signs of a stroke:  ? B - Balance. Signs are dizziness, sudden trouble walking, or loss of balance.  ? E - Eyes. Signs are trouble seeing or a sudden change in vision.  ? F - Face. Signs are a sudden weakness or numbness of the face, or the face or eyelid drooping on one side.  ? A - Arms. Signs are weakness or numbness in an arm. This happens suddenly and usually on one side of the body.  ? S - Speech. Signs are sudden trouble speaking, slurred speech, or trouble understanding what people say.  ? T - Time. Time to call emergency service. Write down what time symptoms started.  · Other signs of a stroke, such as:  ? A sudden, severe headache with no known cause.  ? Nausea or vomiting.  ? Seizure.  Summary  · Aphasia is a language disorder that results from damage to the part of your brain that helps you use language to communicate. Aphasia does not affect your intelligence, but may cause difficulty with speech, writing, reading, or understanding others.  · In some cases, aphasia may improve quickly on its own. In other cases, recovery occurs more slowly over time.  · Get help right away if you have symptoms of a stroke.  This information is not intended to replace advice given to you by your health care provider. Make sure you discuss any questions you have with your health care provider.  Document Released: 09/09/2003 Document Revised: 01/18/2019 Document Reviewed: 01/15/2019  Elsevier " Patient Education © 2020 [employer] Inc.

## 2021-01-06 NOTE — PROGRESS NOTES
Pt discharged to home. All belongings with pt at time of discharge. Discharge instructions including medication information, follow up appts and stroke aftercare provided to pt and pt wife at bedside, both parties verbalized understanding.
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
## 2021-02-03 ENCOUNTER — OFFICE VISIT (OUTPATIENT)
Dept: NEUROLOGY | Facility: MEDICAL CENTER | Age: 71
End: 2021-02-03
Attending: NURSE PRACTITIONER
Payer: MEDICARE

## 2021-02-03 VITALS
OXYGEN SATURATION: 96 % | BODY MASS INDEX: 28.21 KG/M2 | RESPIRATION RATE: 16 BRPM | WEIGHT: 201.5 LBS | DIASTOLIC BLOOD PRESSURE: 70 MMHG | SYSTOLIC BLOOD PRESSURE: 122 MMHG | HEART RATE: 86 BPM | HEIGHT: 71 IN | TEMPERATURE: 98 F

## 2021-02-03 DIAGNOSIS — E78.2 MIXED HYPERLIPIDEMIA: ICD-10-CM

## 2021-02-03 DIAGNOSIS — I10 ESSENTIAL HYPERTENSION: ICD-10-CM

## 2021-02-03 DIAGNOSIS — I69.30 LATE EFFECT OF STROKE: ICD-10-CM

## 2021-02-03 DIAGNOSIS — R25.2 NOCTURNAL MUSCLE CRAMP: ICD-10-CM

## 2021-02-03 DIAGNOSIS — R73.03 PREDIABETES: ICD-10-CM

## 2021-02-03 PROCEDURE — 99214 OFFICE O/P EST MOD 30 MIN: CPT | Performed by: NURSE PRACTITIONER

## 2021-02-03 PROCEDURE — 99212 OFFICE O/P EST SF 10 MIN: CPT | Performed by: NURSE PRACTITIONER

## 2021-02-03 RX ORDER — ROSUVASTATIN CALCIUM 5 MG/1
5 TABLET, COATED ORAL
COMMUNITY
Start: 2021-01-27 | End: 2021-02-03

## 2021-02-03 RX ORDER — ROSUVASTATIN CALCIUM 10 MG/1
10 TABLET, COATED ORAL EVERY EVENING
Qty: 90 TAB | Refills: 3 | Status: SHIPPED | OUTPATIENT
Start: 2021-02-03 | End: 2021-04-14

## 2021-02-03 RX ORDER — TEMAZEPAM 30 MG/1
CAPSULE ORAL
COMMUNITY
Start: 2021-01-12

## 2021-02-03 ASSESSMENT — ENCOUNTER SYMPTOMS
VOMITING: 0
INSOMNIA: 1
SPEECH CHANGE: 1
NERVOUS/ANXIOUS: 1
BRUISES/BLEEDS EASILY: 1
NECK PAIN: 0
DIARRHEA: 0
BACK PAIN: 0
WEAKNESS: 0
SHORTNESS OF BREATH: 1
FEVER: 0
TINGLING: 0
FOCAL WEAKNESS: 0
COUGH: 1
SENSORY CHANGE: 0
DIZZINESS: 0
BLURRED VISION: 0
DEPRESSION: 1
NAUSEA: 0
HEADACHES: 0
HEARTBURN: 0

## 2021-02-03 ASSESSMENT — PATIENT HEALTH QUESTIONNAIRE - PHQ9: CLINICAL INTERPRETATION OF PHQ2 SCORE: 0

## 2021-02-03 ASSESSMENT — FIBROSIS 4 INDEX: FIB4 SCORE: 1.34

## 2021-02-03 NOTE — PATIENT INSTRUCTIONS
If any new signs of stroke:  sudden weakness, numbness, speech difficulty (slurring or difficulty finding words), imbalance, incoordination, worse headache of life or vision loss occur, call 911.      Take all medications as prescribed unless instructed by your provider.    See [facility] instructions.           Stroke Prevention  Some medical conditions and lifestyle choices can lead to a higher risk for a stroke. You can help to prevent a stroke by making nutrition, lifestyle, and other changes.  What nutrition changes can be made?    · Eat healthy foods.  ? Choose foods that are high in fiber. These include:  § Fresh fruits.  § Fresh vegetables.  § Whole grains.  ? Eat at least 5 or more servings of fruits and vegetables each day. Try to fill half of your plate at each meal with fruits and vegetables.  ? Choose lean protein foods. These include:  § Lowfat (lean) cuts of meat.  § Chicken without skin.  § Fish.  § Tofu.  § Beans.  § Nuts.  ? Eat low-fat dairy products.  ? Avoid foods that:  § Are high in salt (sodium).  § Have saturated fat.  § Have trans fat.  § Have cholesterol.  § Are processed.  § Are premade.  · Follow eating guidelines as told by your doctor. These may include:  ? Reducing how many calories you eat and drink each day.  ? Limiting how much salt you eat or drink each day to 1,500 milligrams (mg).  ? Using only healthy fats for cooking. These include:  § Olive oil.  § Canola oil.  § Sunflower oil.  ? Counting how many carbohydrates you eat and drink each day.  What lifestyle changes can be made?  · Try to stay at a healthy weight. Talk to your doctor about what a good weight is for you.  · Get at least 30 minutes of moderate physical activity at least 5 days a week. This can include:  ? Fast walking.  ? Biking.  ? Swimming.  · Do not use any products that have nicotine or tobacco. This includes cigarettes and e-cigarettes. If you need help quitting, ask your doctor. Avoid being around  tobacco smoke in general.  · Limit how much alcohol you drink to no more than 1 drink a day for nonpregnant women and 2 drinks a day for men. One drink equals 12 oz of beer, 5 oz of wine, or 1½ oz of hard liquor.  · Do not use drugs.  · Avoid taking birth control pills. Talk to your doctor about the risks of taking birth control pills if:  ? You are over 35 years old.  ? You smoke.  ? You get migraines.  ? You have had a blood clot.  What other changes can be made?  · Manage your cholesterol.  ? It is important to eat a healthy diet.  ? If your cholesterol cannot be managed through your diet, you may also need to take medicines. Take medicines as told by your doctor.  · Manage your diabetes.  ? It is important to eat a healthy diet and to exercise regularly.  ? If your blood sugar cannot be managed through diet and exercise, you may need to take medicines. Take medicines as told by your doctor.  · Control your high blood pressure (hypertension).  ? Try to keep your blood pressure below 130/80. This can help lower your risk of stroke.  ? It is important to eat a healthy diet and to exercise regularly.  ? If your blood pressure cannot be managed through diet and exercise, you may need to take medicines. Take medicines as told by your doctor.  ? Ask your doctor if you should check your blood pressure at home.  ? Have your blood pressure checked every year. Do this even if your blood pressure is normal.  · Talk to your doctor about getting checked for a sleep disorder. Signs of this can include:  ? Snoring a lot.  ? Feeling very tired.  · Take over-the-counter and prescription medicines only as told by your doctor. These may include aspirin or blood thinners (antiplatelets or anticoagulants).  · Make sure that any other medical conditions you have are managed.  Where to find more information  · American Stroke Association: www.strokeassociation.org  · National Stroke Association: www.stroke.org  Get help right away  "if:  · You have any symptoms of stroke. \"BE FAST\" is an easy way to remember the main warning signs:  ? B - Balance. Signs are dizziness, sudden trouble walking, or loss of balance.  ? E - Eyes. Signs are trouble seeing or a sudden change in how you see.  ? F - Face. Signs are sudden weakness or loss of feeling of the face, or the face or eyelid drooping on one side.  ? A - Arms. Signs are weakness or loss of feeling in an arm. This happens suddenly and usually on one side of the body.  ? S - Speech. Signs are sudden trouble speaking, slurred speech, or trouble understanding what people say.  ? T - Time. Time to call emergency services. Write down what time symptoms started.  · You have other signs of stroke, such as:  ? A sudden, very bad headache with no known cause.  ? Feeling sick to your stomach (nausea).  ? Throwing up (vomiting).  ? Jerky movements you cannot control (seizure).  These symptoms may represent a serious problem that is an emergency. Do not wait to see if the symptoms will go away. Get medical help right away. Call your local emergency services (911 in the [place] Do not drive yourself to the hospital.  Summary  · You can prevent a stroke by eating healthy, exercising, not smoking, drinking less alcohol, and treating other health problems, such as diabetes, high blood pressure, or high cholesterol.  · Do not use any products that contain nicotine or tobacco, such as cigarettes and e-cigarettes.  · Get help right away if you have any signs or symptoms of a stroke.  This information is not intended to replace advice given to you by your health care provider. Make sure you discuss any questions you have with your health care provider.  Document Released: 06/18/2013 Document Revised: 02/13/2020 Document Reviewed: 03/21/2018  Elsevier Patient Education © 2020 [employer]    "

## 2021-02-03 NOTE — PROGRESS NOTES
"Subjective:    HPI  Yuri Smart  is a 70 y.o. right handed male who presents to [facility] for evaluation of L MCA watershed infarcts.     He presented to [facility] ER on 1/1/2021 with complaints of confusion and difficulty with word finding x 1 day.   He was found to have  large L MCA territory infarct with completed occluded L ICA and 90% occlusion of R ICA.  He was transferred to [facility] and underwent Right CEA on 1/3/2021.     PMH includes COPD, prostate cancer, HTN, HLD  Social History:  Former smoker  < 1-ppd x 49 years, -quit 2015, 1 glass of wine per week, uses marijuana edibles every other night for sleep He is a retired musician.      He was discharged on ASA 81mg and Plavix x 6months per vascular surgery, followed by ASA 81mg daily for life.   He was also given Atorvastatin 80mg.  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     He is here with his wife today,  he is getting speech therapy.  He still has some problems swallowing.  He is walking 2 miles every day.  He still has some muscle cramping in his calves but no \"alvarado horses\"  He denies any weakness.  He has chronic insomnia which has worsened since the stroke.         Review of Systems   Constitutional: Negative for fever.   HENT: Negative for hearing loss.    Eyes: Negative for blurred vision.   Respiratory: Positive for cough and shortness of breath.    Cardiovascular: Negative for chest pain.   Gastrointestinal: Negative for diarrhea, heartburn, nausea and vomiting.   Genitourinary: Positive for frequency.        History of prostate cancer.   Musculoskeletal: Negative for back pain and neck pain.   Skin: Negative for rash.   Neurological: Positive for speech change. Negative for dizziness, tingling, sensory change, focal weakness, weakness and headaches.   Endo/Heme/Allergies: Bruises/bleeds easily.   Psychiatric/Behavioral: Positive for " "depression. The patient is nervous/anxious and has insomnia.                Objective:   I personally reviewed imaging below and agree with the findings  MRI brain 1/1/2021  1.  Mild cerebral atrophy.  2.  Moderate supratentorial white matter disease most consistent with microvascular ischemic change.  3.  Clustered confluent and gyriform areas of acute infarction involving the left insula, frontal operculum, and left posterior frontal-parietal convexity in the territory of left MCA sylvian branches. No hemorrhagic transformation.  4.  Left internal carotid artery occlusion.    CTA neck (per report from [facility]), large vessel occlusion of left M3.  Complete occlusion of left ICA.  Severe calcific atherosclerosis with severe stenosis of right proximal ICA.  TTE:  LVEF 70%,   bubble negative, LA size WNL,   Stroke Labs:   Creat 1.10,  , A1C 6.1     /70 (BP Location: Left arm, Patient Position: Sitting, BP Cuff Size: Large adult)   Pulse 86   Temp 36.7 °C (98 °F) (Temporal)   Resp 16   Ht 1.803 m (5' 11\")   Wt 91.4 kg (201 lb 8 oz)   SpO2 96%   BMI 28.10 kg/m²      Physical Exam    Constitutional:  Alert, no apparent distress,  Psych:   mood and affect WNL  Neuro:  Oriented X 4, speech with moderate word finding difficulty, naming and memory intact  Muskuloskeletal:  Moves all extremities equally, strength 5/5 bilaterally, no drift  CN II: Visual fields are full to confrontation. Fundoscopic exam is normal with sharp discs and no vascular changes. Pupils are 3 mm and briskly reactive to light.   CN III, IV, VI  EOMs intact, no ptosis  CN V: Facial sensation is intact to pinprick in all 3 divisions bilaterally. Corneal responses are intact.  CN VII: Face is symmetric with normal eye closure and smile.  CN VII: Hearing is normal to rubbing fingers  CN IX, X: Palate elevates symmetrically. Phonation is normal.  CN XI: Head turning and shoulder shrug are intact  CN XII: Tongue is midline with " normal movements and no atrophy.                           Sensation to PP equal bilaterally                 No limb ataxia with finger to nose and heel to shin                 Ambulates with steady gait.                 Rhomberg negative                Biceps,brachioradialis, tricep, patellar and ankle reflex all 2+     Cardiovascular:    S1S2, no abnormal rhythm auscultated, no peripheral edema  Neck:                     No carotid bruits noted   Pulmonary:            Respirations easy, lungs clear to auscultation all fields.     Skin:                     No obvious rashes.    Iniital NIHSS Unknown      Current NIHSS    1a. LOC: 0  1b. LOC Questions: 0  1c. LOC Commands:0   2. Best Gaze:0  3. Visual Fields: 0  4. Facial Paresis: 0  5a. Motor arm left: 0  5b. Motor arm right: 0  6a. Motor leg left: 0  6b. Motor leg right: 0  7. Sensory: 0  8. Best Language: 1  9. Limb Ataxia: 0  10. Dysarthria: 0  11. Extinction/Inattention: 0    Total Score Current  1          Current mRS  2           Assessment/Plan:     1. Late effect of stroke,.  L MCA territory secondary to L ICA occlusion.   Presumed Mechanism by Toast:  __XX  Large Artery Atherosclerosis  __  Small Vessel (lacunar)  __   Cardioembolic  __   Other (Sickle cell, vasculitis, hypercoagulable)  __   Unknown  __   ESUS    Stroke risk factors include HTN, HLD, pre-diabetes.  Continue Aspirin 81mg with Plavix 75mg x 6 months per vascular surgery, after 6 months, can stop Plavix and continue ASA 81mg forever.  Discussed side effects and signs of bleeding.    2. Essential hypertension  Blood pressure goal less than 130/80, currently at goal.     3. Mixed hyperlipidemia  LDL goal < 70, current 149, he did not tolerate Atorva 80mg, continue Crestor 5mg for a full 2 weeks and then increase to 10mg, goal is to get to at least 20mg, 40mg would be better considering his atherosclerotic disease.  , discussed medication side effects, will need follow up with primary care  evaluate liver function and intervals and refill    Recommend adding at least 400mg of magnesium every night.  Iron studies      4. Prediabetes    A1C 6.1, discussed dietary modifications.                   I have counseled patient on stroke prevention strategies, stroke symptoms and mimics.  Diet and exercise modifications.  We discussed medication side effects and instructions.       I have provided patient a written personalized stroke prevention plan, it is filed under the media tab under ‘[facility]”.    Follow up in 2 months.

## 2021-02-04 ENCOUNTER — HOSPITAL ENCOUNTER (OUTPATIENT)
Dept: LAB | Facility: MEDICAL CENTER | Age: 71
End: 2021-02-04
Attending: NURSE PRACTITIONER
Payer: MEDICARE

## 2021-02-04 DIAGNOSIS — R25.2 NOCTURNAL MUSCLE CRAMP: ICD-10-CM

## 2021-02-11 ENCOUNTER — TELEPHONE (OUTPATIENT)
Dept: NEUROLOGY | Facility: MEDICAL CENTER | Age: 71
End: 2021-02-11

## 2021-02-11 NOTE — TELEPHONE ENCOUNTER
Wife called patient went to get labs drawn. They asked him  to sign abn form in case insurance didn't pay. She stated they told him if the order wasn't coded correctly he might have to pay. Patient left without getting labs drawn. She has since called his insurance and they told her they do pay if medicare pays. She stated patient was going to go back and have labs drawn.

## 2021-04-14 ENCOUNTER — OFFICE VISIT (OUTPATIENT)
Dept: NEUROLOGY | Facility: MEDICAL CENTER | Age: 71
End: 2021-04-14
Attending: NURSE PRACTITIONER
Payer: MEDICARE

## 2021-04-14 VITALS
DIASTOLIC BLOOD PRESSURE: 62 MMHG | HEIGHT: 71 IN | OXYGEN SATURATION: 94 % | WEIGHT: 200.4 LBS | SYSTOLIC BLOOD PRESSURE: 112 MMHG | RESPIRATION RATE: 12 BRPM | TEMPERATURE: 97.9 F | BODY MASS INDEX: 28.06 KG/M2 | HEART RATE: 78 BPM

## 2021-04-14 DIAGNOSIS — E78.2 MIXED HYPERLIPIDEMIA: ICD-10-CM

## 2021-04-14 DIAGNOSIS — I69.30 LATE EFFECT OF STROKE: ICD-10-CM

## 2021-04-14 DIAGNOSIS — I10 ESSENTIAL HYPERTENSION: ICD-10-CM

## 2021-04-14 PROCEDURE — 99214 OFFICE O/P EST MOD 30 MIN: CPT | Performed by: NURSE PRACTITIONER

## 2021-04-14 PROCEDURE — 99212 OFFICE O/P EST SF 10 MIN: CPT | Performed by: NURSE PRACTITIONER

## 2021-04-14 RX ORDER — ROSUVASTATIN CALCIUM 20 MG/1
20 TABLET, COATED ORAL EVERY EVENING
Qty: 90 TABLET | Refills: 3 | Status: SHIPPED | OUTPATIENT
Start: 2021-04-14 | End: 2021-04-15 | Stop reason: SDUPTHER

## 2021-04-14 RX ORDER — DIAZEPAM 10 MG/1
10 TABLET ORAL
COMMUNITY
Start: 2021-02-17

## 2021-04-14 ASSESSMENT — ENCOUNTER SYMPTOMS
DEPRESSION: 1
BLURRED VISION: 0
NERVOUS/ANXIOUS: 0
TINGLING: 0
SPEECH CHANGE: 1
MYALGIAS: 0
VOMITING: 0
INSOMNIA: 1
DIZZINESS: 0
BRUISES/BLEEDS EASILY: 1
HEADACHES: 0
COUGH: 1
SENSORY CHANGE: 0
WEAKNESS: 1
NAUSEA: 0
FEVER: 0

## 2021-04-14 ASSESSMENT — FIBROSIS 4 INDEX: FIB4 SCORE: 1.34

## 2021-04-14 NOTE — PATIENT INSTRUCTIONS
On July 1st, 2021, stop the plavix (clopidrigrel) and increase the aspirin to 325mg daily for the rest of your life.    I increased your Crestor to 20mg today. (it would be best if you can get up to 40mg of Crestor, primary care can increase this if you are tolerating the 20mg)           Stroke Prevention  Some medical conditions and lifestyle choices can lead to a higher risk for a stroke. You can help to prevent a stroke by making nutrition, lifestyle, and other changes.  What nutrition changes can be made?    · Eat healthy foods.  ? Choose foods that are high in fiber. These include:  § Fresh fruits.  § Fresh vegetables.  § Whole grains.  ? Eat at least 5 or more servings of fruits and vegetables each day. Try to fill half of your plate at each meal with fruits and vegetables.  ? Choose lean protein foods. These include:  § Lowfat (lean) cuts of meat.  § Chicken without skin.  § Fish.  § Tofu.  § Beans.  § Nuts.  ? Eat low-fat dairy products.  ? Avoid foods that:  § Are high in salt (sodium).  § Have saturated fat.  § Have trans fat.  § Have cholesterol.  § Are processed.  § Are premade.  · Follow eating guidelines as told by your doctor. These may include:  ? Reducing how many calories you eat and drink each day.  ? Limiting how much salt you eat or drink each day to 1,500 milligrams (mg).  ? Using only healthy fats for cooking. These include:  § Olive oil.  § Canola oil.  § Sunflower oil.  ? Counting how many carbohydrates you eat and drink each day.  What lifestyle changes can be made?  · Try to stay at a healthy weight. Talk to your doctor about what a good weight is for you.  · Get at least 30 minutes of moderate physical activity at least 5 days a week. This can include:  ? Fast walking.  ? Biking.  ? Swimming.  · Do not use any products that have nicotine or tobacco. This includes cigarettes and e-cigarettes. If you need help quitting, ask your doctor. Avoid being around tobacco smoke in general.  · Limit  how much alcohol you drink to no more than 1 drink a day for nonpregnant women and 2 drinks a day for men. One drink equals 12 oz of beer, 5 oz of wine, or 1½ oz of hard liquor.  · Do not use drugs.  · Avoid taking birth control pills. Talk to your doctor about the risks of taking birth control pills if:  ? You are over 35 years old.  ? You smoke.  ? You get migraines.  ? You have had a blood clot.  What other changes can be made?  · Manage your cholesterol.  ? It is important to eat a healthy diet.  ? If your cholesterol cannot be managed through your diet, you may also need to take medicines. Take medicines as told by your doctor.  · Manage your diabetes.  ? It is important to eat a healthy diet and to exercise regularly.  ? If your blood sugar cannot be managed through diet and exercise, you may need to take medicines. Take medicines as told by your doctor.  · Control your high blood pressure (hypertension).  ? Try to keep your blood pressure below 130/80. This can help lower your risk of stroke.  ? It is important to eat a healthy diet and to exercise regularly.  ? If your blood pressure cannot be managed through diet and exercise, you may need to take medicines. Take medicines as told by your doctor.  ? Ask your doctor if you should check your blood pressure at home.  ? Have your blood pressure checked every year. Do this even if your blood pressure is normal.  · Talk to your doctor about getting checked for a sleep disorder. Signs of this can include:  ? Snoring a lot.  ? Feeling very tired.  · Take over-the-counter and prescription medicines only as told by your doctor. These may include aspirin or blood thinners (antiplatelets or anticoagulants).  · Make sure that any other medical conditions you have are managed.  Where to find more information  · American Stroke Association: www.strokeassociation.org  · National Stroke Association: www.stroke.org  Get help right away if:  · You have any symptoms of stroke.  "\"BE FAST\" is an easy way to remember the main warning signs:  ? B - Balance. Signs are dizziness, sudden trouble walking, or loss of balance.  ? E - Eyes. Signs are trouble seeing or a sudden change in how you see.  ? F - Face. Signs are sudden weakness or loss of feeling of the face, or the face or eyelid drooping on one side.  ? A - Arms. Signs are weakness or loss of feeling in an arm. This happens suddenly and usually on one side of the body.  ? S - Speech. Signs are sudden trouble speaking, slurred speech, or trouble understanding what people say.  ? T - Time. Time to call emergency services. Write down what time symptoms started.  · You have other signs of stroke, such as:  ? A sudden, very bad headache with no known cause.  ? Feeling sick to your stomach (nausea).  ? Throwing up (vomiting).  ? Jerky movements you cannot control (seizure).  These symptoms may represent a serious problem that is an emergency. Do not wait to see if the symptoms will go away. Get medical help right away. Call your local emergency services (911 in the [place] Do not drive yourself to the hospital.  Summary  · You can prevent a stroke by eating healthy, exercising, not smoking, drinking less alcohol, and treating other health problems, such as diabetes, high blood pressure, or high cholesterol.  · Do not use any products that contain nicotine or tobacco, such as cigarettes and e-cigarettes.  · Get help right away if you have any signs or symptoms of a stroke.  This information is not intended to replace advice given to you by your health care provider. Make sure you discuss any questions you have with your health care provider.  Document Released: 06/18/2013 Document Revised: 02/13/2020 Document Reviewed: 03/21/2018  Elsevier Patient Education © 2020 [employer]    "

## 2021-04-14 NOTE — PROGRESS NOTES
Subjective:    OLMAN Smart  is a 70 y.o. right handed male who is here for follow up today for evaluation of L MCA watershed infarcts.     I last saw him on 2/3/2021     He presented to [facility] ER on 1/1/2021 with complaints of confusion and difficulty with word finding x 1 day.   He was found to have  large L MCA territory infarct with completed occluded L ICA and 90% occlusion of R ICA.  He was transferred to [facility] and underwent Right CEA on 1/3/2021.      PMH includes COPD, prostate cancer, HTN, HLD  Social History:  Former smoker  < 1-ppd x 49 years, -quit 2015, 1 glass of wine per week, uses marijuana edibles every other night for sleep He is a retired musician.       He was discharged on ASA 81mg and Plavix x 6months per vascular surgery, followed by ASA 81mg daily for life.   He was also given Atorvastatin 80mg.  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      He is here with his wife today,  he continues to get speech therapy  He is working out 5-6 days per week and is up to walking 4 miles per day.  He is tolerating the Crestor 10mg.         Review of Systems   Constitutional: Negative for fever.   HENT: Negative for hearing loss.    Eyes: Negative for blurred vision.   Respiratory: Positive for cough.    Cardiovascular: Negative for chest pain.   Gastrointestinal: Negative for nausea and vomiting.   Genitourinary: Negative.    Musculoskeletal: Negative for myalgias.   Skin: Negative for rash.   Neurological: Positive for speech change and weakness. Negative for dizziness, tingling, sensory change and headaches.   Endo/Heme/Allergies: Bruises/bleeds easily.   Psychiatric/Behavioral: Positive for depression. The patient has insomnia. The patient is not nervous/anxious.             Current Outpatient Medications on File Prior to Visit   Medication Sig Dispense Refill   • temazepam (RESTORIL) 30 MG capsule  "TAKE 1 CAPSULE BY MOUTH AT BEDTIME AS NEEDED     • rosuvastatin (CRESTOR) 10 MG Tab Take 1 Tab by mouth every evening. 90 Tab 3   • aspirin EC 81 MG EC tablet Take 1 Tab by mouth every day. 30 Tab    • budesonide-formoterol (SYMBICORT) 160-4.5 MCG/ACT Aerosol Inhale 2 Puffs 2 Times a Day. Indications: Chronic Obstructive Lung Disease     • clopidogrel (PLAVIX) 75 MG Tab Take 1 Tab by mouth every day for 240 days. (Patient not taking: Reported on 4/14/2021) 240 Tab 0   • lisinopril (PRINIVIL) 10 MG Tab Take 1 Tab by mouth every day. (Patient not taking: Reported on 4/14/2021) 30 Tab 0   • senna-docusate (PERICOLACE OR SENOKOT S) 8.6-50 MG Tab Take 2 Tabs by mouth every day. (Patient not taking: Reported on 4/14/2021)       No current facility-administered medications on file prior to visit.     Past Medical History:   Diagnosis Date   • Cancer (HCC)     prostate   • COPD (chronic obstructive pulmonary disease) ([facility])         Objective:     /62   Pulse 78   Temp 36.6 °C (97.9 °F) (Temporal)   Resp 12   Ht 1.803 m (5' 11\")   Wt 90.9 kg (200 lb 6.4 oz)   SpO2 94%   BMI 27.95 kg/m²      Physical Exam       Constitutional:  Alert, no apparent distress,  Psych:   mood and affect WNL  Neuro:  Oriented X 4, speech with pauses and word finding diffiuclty, naming and memory intact  Muskuloskeletal:  Moves all extremities equally, strength 5/5 bilaterally, no drift  CN II: Visual fields are full to confrontation. Fundoscopic exam is normal with sharp discs and no vascular changes. Pupils are 3 mm and briskly reactive to light.   CN III, IV, VI  EOMs intact, no ptosis  CN V: Facial sensation is intact to pinprick in all 3 divisions bilaterally. Corneal responses are intact.  CN VII: Face is symmetric with normal eye closure and smile.  CN VII: Hearing is normal to rubbing fingers  CN IX, X: Palate elevates symmetrically. Phonation is normal.  CN XI: Head turning and shoulder shrug are intact  CN XII: Tongue is " midline with normal movements and no atrophy.                           Sensation to PP equal bilaterally                 No limb ataxia with finger to nose and heel to shin                 Ambulates with steady gait.                 Rhomberg negative              Cardiovascular:    S1S2, no abnormal rhythm auscultated, no peripheral edema  Neck:                     No carotid bruits noted   Pulmonary:            Respirations easy, lungs clear to auscultation all fields.     Skin:                     No obvious rashes.         Iniital NIHSS Unknown        Current NIHSS     1a. LOC: 0  1b. LOC Questions: 0  1c. LOC Commands:0   2. Best Gaze:0  3. Visual Fields: 0  4. Facial Paresis: 0  5a. Motor arm left: 0  5b. Motor arm right: 0  6a. Motor leg left: 0  6b. Motor leg right: 0  7. Sensory: 0  8. Best Language: 1  9. Limb Ataxia: 0  10. Dysarthria: 0  11. Extinction/Inattention: 0     Total Score Current  1             Current mRS  2                Assessment/Plan:     1. Late effect of stroke,.  L MCA territory secondary to L ICA occlusion.   Presumed Mechanism by Toast:  __XX  Large Artery Atherosclerosis  __  Small Vessel (lacunar)  __   Cardioembolic  __   Other (Sickle cell, vasculitis, hypercoagulable)  __   Unknown  __   ESUS     Stroke risk factors include HTN, HLD, pre-diabetes.  Continue Aspirin 81mg with Plavix 75mg x 6 months per vascular surgery, after 6 months (7/1/2021), can stop Plavix and continue ASA 325mg forever.  Discussed side effects and signs of bleeding.      2. Essential hypertension  Blood pressure goal less than 130/80, currently at goal.      3. Mixed hyperlipidemia  LDL goal < 70, current 149, he did not tolerate Atorva 80mg, increase Crestor to 20mg, (he is tolerating 10mg)  40mg would be better considering his atherosclerotic disease, primary care can increase to 40mg.   , discussed medication side effects, will need follow up with primary care evaluate liver function and intervals and  refill     Recommend adding at least 400mg of magnesium every night.  Iron studies        4. Prediabetes     A1C 6.1, discussed dietary modifications.          I spent 34 minutes caring for patient my time includes record review, assessment and history taking as well as counseling.             I have counseled patient on stroke prevention strategies, stroke symptoms and mimics.  Diet and exercise modifications.  We discussed medication side effects and instructions.        I have provided patient a written personalized stroke prevention plan, it is filed under the media tab under ‘[facility]”.     Follow up in 1 year.

## 2021-04-15 DIAGNOSIS — I69.30 LATE EFFECT OF STROKE: ICD-10-CM

## 2021-04-15 RX ORDER — ROSUVASTATIN CALCIUM 20 MG/1
20 TABLET, COATED ORAL EVERY EVENING
Qty: 90 TABLET | Refills: 3 | Status: SHIPPED | OUTPATIENT
Start: 2021-04-15 | End: 2022-04-15

## (undated) DEVICE — SOD. CHL. INJ. 0.9% 1000 ML - (14EA/CA 60CA/PF)

## (undated) DEVICE — POLY UMBILICAL TAPE 1/8X30 - (36/BX)

## (undated) DEVICE — DRAPE IOBAN II INCISE 23X17 - (10EA/BX 4BX/CA)

## (undated) DEVICE — SUCTION INSTRUMENT YANKAUER BULBOUS TIP W/O VENT (50EA/CA)

## (undated) DEVICE — SODIUM CHL. INJ. 0.9% 500ML (24EA/CA 50CA/PF)

## (undated) DEVICE — SYRINGE 30 ML LL (56/BX)

## (undated) DEVICE — KIT ANESTHESIA W/CIRCUIT & 3/LT BAG W/FILTER (20EA/CA)

## (undated) DEVICE — KIT SURGIFLO W/OUT THROMBIN - (6EA/CA)

## (undated) DEVICE — BLADE SURGICAL #11 - (50/BX)

## (undated) DEVICE — DRAIN J-VAC 7MM FLAT - (10EA/CA)

## (undated) DEVICE — HEAD HOLDER JUNIOR/ADULT

## (undated) DEVICE — SUTURE 3-0 SILK 12 X 18 IN - (36/BX)

## (undated) DEVICE — DECANTER FLD BLS - (50/CA)

## (undated) DEVICE — SUTURE CV

## (undated) DEVICE — STAPLER SKIN DISP - (6/BX 10BX/CA) VISISTAT

## (undated) DEVICE — GOWN WARMING STANDARD FLEX - (30/CA)

## (undated) DEVICE — GLOVE SZ 6.5 BIOGEL PI MICRO - PF LF (50PR/BX)

## (undated) DEVICE — APPLICATOR SURGIFLO - (6EA/CA)

## (undated) DEVICE — SENSOR SPO2 NEO LNCS ADHESIVE (20/BX) SEE USER NOTES

## (undated) DEVICE — DRAPE MAGNETIC (INSTRA-MAG) - (30/CA)

## (undated) DEVICE — SOD. CHL. INJ. 0.9% 250 ML - (36/CA 50CA/PF)

## (undated) DEVICE — SUTURE 4-0 30CM STRATAFIX SPIRAL PS-2 (12EA/BX)

## (undated) DEVICE — SET LEADWIRE 5 LEAD BEDSIDE DISPOSABLE ECG (1SET OF 5/EA)

## (undated) DEVICE — GLOVE BIOGEL SZ 8 SURGICAL PF LTX - (50PR/BX 4BX/CA)

## (undated) DEVICE — VESSELOOP MINI BLUE STERILE - SURG-I-LOOP (10EA/BX)

## (undated) DEVICE — PROTECTOR ULNA NERVE - (36PR/CA)

## (undated) DEVICE — NEPTUNE 4 PORT MANIFOLD - (20/PK)

## (undated) DEVICE — SUTURE 2-0 VICRYL PLUS CT-1 36 (36PK/BX)"

## (undated) DEVICE — ELECTRODE 850 FOAM ADHESIVE - HYDROGEL RADIOTRNSPRNT (50/PK)

## (undated) DEVICE — SUTURE 2-0 SILK 12 X 18" (36PK/BX)"

## (undated) DEVICE — SODIUM CHL IRRIGATION 0.9% 1000ML (12EA/CA)

## (undated) DEVICE — SUTURE 6-0 PROLENE BV-1 D/A 24 (36PK/BX)"

## (undated) DEVICE — DERMABOND ADVANCED - (12EA/BX)

## (undated) DEVICE — LACTATED RINGERS INJ 1000 ML - (14EA/CA 60CA/PF)

## (undated) DEVICE — TOWELS CLOTH SURGICAL - (4/PK 20PK/CA)

## (undated) DEVICE — CLIP MED INTNL HRZN TI ESCP - (25/BX)

## (undated) DEVICE — SUTURE GENERAL

## (undated) DEVICE — BLANKET WARMING LOWER BODY - (10/CA) INACTIVE USE #8585

## (undated) DEVICE — VESSELOOP MAXI BLUE STERILE- SURG-I-LOOP (10EA/BX)

## (undated) DEVICE — Device

## (undated) DEVICE — CHLORAPREP 26 ML APPLICATOR - ORANGE TINT(25/CA)

## (undated) DEVICE — SLEEVE, VASO, THIGH, MED

## (undated) DEVICE — SET EXTENSION WITH 2 PORTS (48EA/CA) ***PART #2C8610 IS A SUBSTITUTE*****

## (undated) DEVICE — DRESSING TRANSPARENT FILM TEGADERM 4 X 4.75" (50EA/BX)"

## (undated) DEVICE — GOWN SURGEONS X-LARGE - DISP. (30/CA)

## (undated) DEVICE — TUBING CLEARLINK DUO-VENT - C-FLO (48EA/CA)

## (undated) DEVICE — MASK ANESTHESIA ADULT  - (100/CA)

## (undated) DEVICE — SUTURE 2-0 ETHILON FS - (36/BX) 18 INCH

## (undated) DEVICE — ELECTRODE DUAL RETURN W/ CORD - (50/PK)

## (undated) DEVICE — PACK MINOR BASIN - (2EA/CA)

## (undated) DEVICE — SHEET THYROID - (10EA/CA)

## (undated) DEVICE — GLOVE BIOGEL PI INDICATOR SZ 6.5 SURGICAL PF LF - (50/BX 4BX/CA)

## (undated) DEVICE — SPONGE GAUZESTER 4 X 4 4PLY - (128PK/CA)

## (undated) DEVICE — INTRAOP NEURO IN OR 1:1 PER 15 MIN

## (undated) DEVICE — WIPE INSTRUMENT MICROWIPE (20EA/SP)

## (undated) DEVICE — CANISTER SUCTION 3000ML MECHANICAL FILTER AUTO SHUTOFF MEDI-VAC NONSTERILE LF DISP  (40EA/CA)

## (undated) DEVICE — GLOVE BIOGEL INDICATOR SZ 8 SURGICAL PF LTX - (50/BX 4BX/CA)

## (undated) DEVICE — CLIP SM INTNL HRZN TI ESCP LGT - (24EA/PK 25PK/BX)